# Patient Record
Sex: FEMALE | Race: WHITE | NOT HISPANIC OR LATINO | Employment: OTHER | URBAN - METROPOLITAN AREA
[De-identification: names, ages, dates, MRNs, and addresses within clinical notes are randomized per-mention and may not be internally consistent; named-entity substitution may affect disease eponyms.]

---

## 2017-07-03 ENCOUNTER — TELEPHONE (OUTPATIENT)
Dept: FAMILY MEDICINE CLINIC | Facility: CLINIC | Age: 57
End: 2017-07-03

## 2017-07-03 DIAGNOSIS — L98.9 LESION OF NECK: Primary | ICD-10-CM

## 2017-07-03 NOTE — TELEPHONE ENCOUNTER
We have a list of dermatologist.  You could send this to her or she could come by and pick this list up

## 2017-07-03 NOTE — TELEPHONE ENCOUNTER
Pt is going to [phone]. She has a spot on her neck that wont heal. Its itching and speading. She needs to be seen but we have to make appointment. Dr martin said it was ok to do referral.

## 2017-07-03 NOTE — TELEPHONE ENCOUNTER
Pt is going to [facility] [phone]. She has a spot on her neck that wont heal. Its itching and speading. She needs to be seen but we have to make appointment. Dr quiroz said it was ok to do referral.    Dr Quiroz you need to put the referral in for her because they wont let her make the appt

## 2017-07-03 NOTE — TELEPHONE ENCOUNTER
PT SAID SHE HAS A SPOT ON HER NECK THAT HAS BEEN RED AND SORE FOR AT LEAST A YEAR NOW AND WANTS TO KNOW IF [facility] WOULD REFER HER TO A DERMATOLOGIST     [phone]

## 2017-09-14 ENCOUNTER — OFFICE VISIT (OUTPATIENT)
Dept: FAMILY MEDICINE CLINIC | Facility: CLINIC | Age: 57
End: 2017-09-14

## 2017-09-14 VITALS
OXYGEN SATURATION: 98 % | TEMPERATURE: 98.6 F | BODY MASS INDEX: 28.28 KG/M2 | WEIGHT: 176 LBS | HEART RATE: 77 BPM | SYSTOLIC BLOOD PRESSURE: 118 MMHG | HEIGHT: 66 IN | DIASTOLIC BLOOD PRESSURE: 70 MMHG | RESPIRATION RATE: 18 BRPM

## 2017-09-14 DIAGNOSIS — R13.10 DYSPHAGIA, UNSPECIFIED TYPE: ICD-10-CM

## 2017-09-14 DIAGNOSIS — J35.1 TONSILLAR ENLARGEMENT: ICD-10-CM

## 2017-09-14 DIAGNOSIS — R73.9 HYPERGLYCEMIA: ICD-10-CM

## 2017-09-14 DIAGNOSIS — M26.629 TMJ SYNDROME: ICD-10-CM

## 2017-09-14 DIAGNOSIS — S81.801A WOUND OF RIGHT LEG, INITIAL ENCOUNTER: ICD-10-CM

## 2017-09-14 DIAGNOSIS — E03.9 HYPOTHYROIDISM, UNSPECIFIED TYPE: Primary | ICD-10-CM

## 2017-09-14 LAB
ALBUMIN SERPL-MCNC: 4.7 G/DL (ref 3.5–5.2)
ALBUMIN/GLOB SERPL: 1.6 G/DL
ALP SERPL-CCNC: 107 U/L (ref 39–117)
ALT SERPL W P-5'-P-CCNC: 27 U/L (ref 1–33)
ANION GAP SERPL CALCULATED.3IONS-SCNC: 12.7 MMOL/L
AST SERPL-CCNC: 22 U/L (ref 1–32)
BILIRUB SERPL-MCNC: 0.3 MG/DL (ref 0.1–1.2)
BUN BLD-MCNC: 15 MG/DL (ref 6–20)
BUN/CREAT SERPL: 18.8 (ref 7–25)
CALCIUM SPEC-SCNC: 10.4 MG/DL (ref 8.6–10.5)
CHLORIDE SERPL-SCNC: 103 MMOL/L (ref 98–107)
CHOLEST SERPL-MCNC: 235 MG/DL (ref 0–200)
CO2 SERPL-SCNC: 28.3 MMOL/L (ref 22–29)
CREAT BLD-MCNC: 0.8 MG/DL (ref 0.57–1)
ERYTHROCYTE [DISTWIDTH] IN BLOOD BY AUTOMATED COUNT: 11.9 % (ref 4.5–15)
GFR SERPL CREATININE-BSD FRML MDRD: 74 ML/MIN/1.73
GLOBULIN UR ELPH-MCNC: 3 GM/DL
GLUCOSE BLD-MCNC: 87 MG/DL (ref 65–99)
HBA1C MFR BLD: 5.03 % (ref 4.8–5.6)
HCT VFR BLD AUTO: 44.1 % (ref 31–42)
HDLC SERPL-MCNC: 56 MG/DL (ref 40–60)
HGB BLD-MCNC: 15.1 G/DL (ref 12–18)
LDLC SERPL CALC-MCNC: 118 MG/DL (ref 0–100)
LDLC/HDLC SERPL: 2.11 {RATIO}
LYMPHOCYTES # BLD AUTO: 3.7 10*3/MM3 (ref 1.2–3.4)
LYMPHOCYTES NFR BLD AUTO: 36.9 % (ref 21–51)
MCH RBC QN AUTO: 29.6 PG (ref 26.1–33.1)
MCHC RBC AUTO-ENTMCNC: 34.2 G/DL (ref 33–37)
MCV RBC AUTO: 86.7 FL (ref 80–99)
MONOCYTES # BLD AUTO: 0.7 10*3/MM3 (ref 0.1–0.6)
MONOCYTES NFR BLD AUTO: 6.6 % (ref 2–9)
NEUTROPHILS # BLD AUTO: 5.7 10*3/MM3 (ref 1.4–6.5)
NEUTROPHILS NFR BLD AUTO: 56.5 % (ref 42–75)
PLATELET # BLD AUTO: 319 10*3/MM3 (ref 150–450)
PMV BLD AUTO: 7.2 FL (ref 7.1–10.5)
POTASSIUM BLD-SCNC: 4.4 MMOL/L (ref 3.5–5.2)
PROT SERPL-MCNC: 7.7 G/DL (ref 6–8.5)
RBC # BLD AUTO: 5.08 10*6/MM3 (ref 4–6)
SODIUM BLD-SCNC: 144 MMOL/L (ref 136–145)
T-UPTAKE NFR SERPL: 1.15 TBI (ref 0.8–1.3)
T4 SERPL-MCNC: 7.12 MCG/DL (ref 4.5–11.7)
TRIGL SERPL-MCNC: 303 MG/DL (ref 0–150)
TSH SERPL DL<=0.05 MIU/L-ACNC: 0.9 MIU/ML (ref 0.27–4.2)
VLDLC SERPL-MCNC: 60.6 MG/DL (ref 5–40)
WBC NRBC COR # BLD: 10 10*3/MM3 (ref 4.5–10)

## 2017-09-14 PROCEDURE — 80053 COMPREHEN METABOLIC PANEL: CPT | Performed by: FAMILY MEDICINE

## 2017-09-14 PROCEDURE — 99214 OFFICE O/P EST MOD 30 MIN: CPT | Performed by: FAMILY MEDICINE

## 2017-09-14 PROCEDURE — 80061 LIPID PANEL: CPT | Performed by: FAMILY MEDICINE

## 2017-09-14 PROCEDURE — 84479 ASSAY OF THYROID (T3 OR T4): CPT | Performed by: FAMILY MEDICINE

## 2017-09-14 PROCEDURE — 84443 ASSAY THYROID STIM HORMONE: CPT | Performed by: FAMILY MEDICINE

## 2017-09-14 PROCEDURE — 36415 COLL VENOUS BLD VENIPUNCTURE: CPT | Performed by: FAMILY MEDICINE

## 2017-09-14 PROCEDURE — 84436 ASSAY OF TOTAL THYROXINE: CPT | Performed by: FAMILY MEDICINE

## 2017-09-14 PROCEDURE — 83036 HEMOGLOBIN GLYCOSYLATED A1C: CPT | Performed by: FAMILY MEDICINE

## 2017-09-14 PROCEDURE — 85025 COMPLETE CBC W/AUTO DIFF WBC: CPT | Performed by: FAMILY MEDICINE

## 2017-09-14 RX ORDER — SODIUM PHOSPHATE,MONO-DIBASIC 19G-7G/118
1 ENEMA (ML) RECTAL 2 TIMES DAILY WITH MEALS
COMMUNITY

## 2017-09-14 RX ORDER — UBIDECARENONE 100 MG
100 CAPSULE ORAL 2 TIMES DAILY
COMMUNITY

## 2017-09-14 RX ORDER — MULTIPLE VITAMINS W/ MINERALS TAB 9MG-400MCG
1 TAB ORAL DAILY
COMMUNITY

## 2017-09-14 NOTE — PROGRESS NOTES
SUBJECTIVE:  The patient is a 56-year-old white female who comes in with several issues.  She feels a pressure sensation in her neck.  At night she wakes up and chokes.  He denies other signs of reflux.  She hurts in her temporomandibular joint in that she has had TMJ syndrome in the past.  The anterior part of her neck is sore.  She had what appears to be a bite wound on her right lower extremity a month ago.  She brings in pictures of these and the wound looked very infected.  She's been self treating with steroids and topical antibiotics.  She states is much better.    PAST MEDICAL HISTORY:  Reviewed.    REVIEW OF SYSTEMS:  Please see above; 14 point ROS otherwise negative.      OBJECTIVE: Vitals signs are reviewed and are stable.    HEENT: PERRLA.  The right tonsil is enlarged and appears to be pushing on the uvula.  The left side of the oral pharynx looks normal.  TMs clear.  Neck:  Supple.  Fullness is present in the anterior neck.  No tenderness.  Lungs:  Clear.    Heart:  Regular rate and rhythm.    Abdomen:   Soft, nontender.    Extremities:  No cyanosis, clubbing or edema.  There is only a minimal area of faint erythema present over a pinpoint almost healed wound on the right lateral tib-fib region.    ASSESSMENT:    Thyromegaly  Unilateral tonsillar enlargement  Probable spider bite-healed (this seems unusual to me)  Dysphagia  TMJ syndrome    PLAN:  The area on the lower leg does look much better than the pictures previously.  The patient does not want to take an antibiotic.  She will advise me if the area worsens.  There is still minimal redness present however she does not want to take treatment for this.  She'll be referred to ENT.  An ultrasound of her thyroid will be obtained.  She'll have a TSH thyroid profile CMP fasting lipid hemoglobin A1c and CBC done.  She will follow up on her labs.  She will notify me if any problems in the interim.    Much of this encounter note is an electronic  transcription/translation of spoken language to printed text.  The electronic translation of spoken language may permit erroneous, or at times, nonsensical words or phrases to be inadvertently transcribed.  Although I have reviewed the note for such errors, some may still exist.

## 2017-09-21 ENCOUNTER — TELEPHONE (OUTPATIENT)
Dept: FAMILY MEDICINE CLINIC | Facility: CLINIC | Age: 57
End: 2017-09-21

## 2017-09-21 RX ORDER — DOXYCYCLINE HYCLATE 100 MG/1
100 CAPSULE ORAL 2 TIMES DAILY
Qty: 20 CAPSULE | Refills: 0 | Status: SHIPPED | OUTPATIENT
Start: 2017-09-21 | End: 2019-08-07

## 2017-09-21 NOTE — TELEPHONE ENCOUNTER
PT WANTS TO HAVE SPOT ON LEG TREATED AND NEEDS MEDS FOR IT. SAID SHE CAME IN ON Monday AND DR LANE WANTED TO TREAT IT BUT SHE REFUSED. SHE NOW WANTS TREATMENT FOR IT.

## 2017-09-22 ENCOUNTER — HOSPITAL ENCOUNTER (OUTPATIENT)
Dept: ULTRASOUND IMAGING | Facility: HOSPITAL | Age: 57
Discharge: HOME | End: 2017-09-22
Admitting: FAMILY MEDICINE

## 2017-09-22 DIAGNOSIS — E03.9 HYPOTHYROIDISM, UNSPECIFIED TYPE: ICD-10-CM

## 2017-09-22 PROCEDURE — 76536 US EXAM OF HEAD AND NECK: CPT

## 2017-09-24 DIAGNOSIS — R93.89 ABNORMAL ULTRASOUND: Primary | ICD-10-CM

## 2017-10-05 ENCOUNTER — LAB (OUTPATIENT)
Dept: LAB | Facility: HOSPITAL | Age: 57
End: 2017-10-05
Attending: OTOLARYNGOLOGY

## 2017-10-05 ENCOUNTER — TRANSCRIBE ORDERS (OUTPATIENT)
Dept: LAB | Facility: HOSPITAL | Age: 57
End: 2017-10-05

## 2017-10-05 DIAGNOSIS — N20.0 URIC ACID NEPHROLITHIASIS: ICD-10-CM

## 2017-10-05 DIAGNOSIS — E83.52 HYPERCALCEMIA: Primary | ICD-10-CM

## 2017-10-05 DIAGNOSIS — E83.52 HYPERCALCEMIA: ICD-10-CM

## 2017-10-05 LAB
25(OH)D3 SERPL-MCNC: 30.8 NG/ML (ref 30–100)
CALCIUM SPEC-SCNC: 9.4 MG/DL (ref 8.6–10.5)
MAGNESIUM SERPL-MCNC: 2.1 MG/DL (ref 1.6–2.6)
PTH-INTACT SERPL-MCNC: 61.3 PG/ML (ref 15–65)

## 2017-10-05 PROCEDURE — 83735 ASSAY OF MAGNESIUM: CPT

## 2017-10-05 PROCEDURE — 83970 ASSAY OF PARATHORMONE: CPT

## 2017-10-05 PROCEDURE — 82310 ASSAY OF CALCIUM: CPT

## 2017-10-05 PROCEDURE — 36415 COLL VENOUS BLD VENIPUNCTURE: CPT

## 2017-10-05 PROCEDURE — 82306 VITAMIN D 25 HYDROXY: CPT

## 2018-11-06 ENCOUNTER — TELEPHONE (OUTPATIENT)
Dept: FAMILY MEDICINE CLINIC | Facility: CLINIC | Age: 58
End: 2018-11-06

## 2018-11-06 DIAGNOSIS — L98.9 ARM LESION: Primary | ICD-10-CM

## 2018-11-06 NOTE — TELEPHONE ENCOUNTER
PT STATES SHE HAS A PLACE/KNOT ON HER RIGHT ARM, IT IS SORE AND ITCHES.  SHE ASKED FOR  TO PLEASE PUT IN A REFERRAL FOR HER TO SEE A DERMATOLOGIST FOR THIS.  SHE SAID DR. YEN OROZCO, WHICH IS WHO SHE SAW FOR HER NECK IS FINE. BUT IT DOES NOT HAVE TO BE HER, JUST AS LONG AS IT IS A DOCTOR THAT HER INSURANCE WILL COVER

## 2019-08-07 ENCOUNTER — OFFICE VISIT (OUTPATIENT)
Dept: FAMILY MEDICINE CLINIC | Facility: CLINIC | Age: 59
End: 2019-08-07

## 2019-08-07 VITALS
SYSTOLIC BLOOD PRESSURE: 112 MMHG | HEART RATE: 86 BPM | OXYGEN SATURATION: 98 % | BODY MASS INDEX: 28.54 KG/M2 | TEMPERATURE: 97.7 F | HEIGHT: 66 IN | WEIGHT: 177.6 LBS | DIASTOLIC BLOOD PRESSURE: 80 MMHG

## 2019-08-07 DIAGNOSIS — Z00.00 ANNUAL PHYSICAL EXAM: Primary | ICD-10-CM

## 2019-08-07 DIAGNOSIS — Z80.0 FAMILY HISTORY OF COLON CANCER IN FATHER: ICD-10-CM

## 2019-08-07 DIAGNOSIS — Z12.11 COLON CANCER SCREENING: ICD-10-CM

## 2019-08-07 DIAGNOSIS — R10.9 ABDOMINAL DISCOMFORT: ICD-10-CM

## 2019-08-07 DIAGNOSIS — M54.6 CHRONIC MIDLINE THORACIC BACK PAIN: ICD-10-CM

## 2019-08-07 DIAGNOSIS — Z90.49 HISTORY OF BOWEL RESECTION: ICD-10-CM

## 2019-08-07 DIAGNOSIS — G89.29 CHRONIC MIDLINE THORACIC BACK PAIN: ICD-10-CM

## 2019-08-07 LAB
ALBUMIN SERPL-MCNC: 4.9 G/DL (ref 3.5–5.2)
ALBUMIN/GLOB SERPL: 1.6 G/DL
ALP SERPL-CCNC: 109 U/L (ref 39–117)
ALT SERPL W P-5'-P-CCNC: 28 U/L (ref 1–33)
ANION GAP SERPL CALCULATED.3IONS-SCNC: 11.2 MMOL/L (ref 5–15)
AST SERPL-CCNC: 24 U/L (ref 1–32)
BILIRUB SERPL-MCNC: 0.3 MG/DL (ref 0.2–1.2)
BUN BLD-MCNC: 10 MG/DL (ref 6–20)
BUN/CREAT SERPL: 13.2 (ref 7–25)
CALCIUM SPEC-SCNC: 10.1 MG/DL (ref 8.6–10.5)
CHLORIDE SERPL-SCNC: 101 MMOL/L (ref 98–107)
CHOLEST SERPL-MCNC: 220 MG/DL (ref 0–200)
CO2 SERPL-SCNC: 29.8 MMOL/L (ref 22–29)
CREAT BLD-MCNC: 0.76 MG/DL (ref 0.57–1)
ERYTHROCYTE [DISTWIDTH] IN BLOOD BY AUTOMATED COUNT: 12.1 % (ref 12.3–15.4)
GFR SERPL CREATININE-BSD FRML MDRD: 78 ML/MIN/1.73
GLOBULIN UR ELPH-MCNC: 3.1 GM/DL
GLUCOSE BLD-MCNC: 93 MG/DL (ref 65–99)
HCT VFR BLD AUTO: 44.4 % (ref 34–46.6)
HDLC SERPL-MCNC: 58 MG/DL (ref 40–60)
HGB BLD-MCNC: 14.9 G/DL (ref 12–15.9)
LDLC SERPL CALC-MCNC: 140 MG/DL (ref 0–100)
LDLC/HDLC SERPL: 2.41 {RATIO}
LYMPHOCYTES # BLD AUTO: 2.8 10*3/MM3 (ref 0.7–3.1)
LYMPHOCYTES NFR BLD AUTO: 45.4 % (ref 19.6–45.3)
MCH RBC QN AUTO: 29 PG (ref 26.6–33)
MCHC RBC AUTO-ENTMCNC: 33.5 G/DL (ref 31.5–35.7)
MCV RBC AUTO: 86.6 FL (ref 79–97)
MONOCYTES # BLD AUTO: 0.5 10*3/MM3 (ref 0.1–0.9)
MONOCYTES NFR BLD AUTO: 7.7 % (ref 5–12)
NEUTROPHILS # BLD AUTO: 2.9 10*3/MM3 (ref 1.7–7)
NEUTROPHILS NFR BLD AUTO: 46.9 % (ref 42.7–76)
PLATELET # BLD AUTO: 296 10*3/MM3 (ref 140–450)
PMV BLD AUTO: 7.5 FL (ref 6–12)
POTASSIUM BLD-SCNC: 4.7 MMOL/L (ref 3.5–5.2)
PROT SERPL-MCNC: 8 G/DL (ref 6–8.5)
RBC # BLD AUTO: 5.13 10*6/MM3 (ref 3.77–5.28)
SODIUM BLD-SCNC: 142 MMOL/L (ref 136–145)
TRIGL SERPL-MCNC: 112 MG/DL (ref 0–150)
TSH SERPL DL<=0.05 MIU/L-ACNC: 1.19 MIU/ML (ref 0.27–4.2)
VLDLC SERPL-MCNC: 22.4 MG/DL (ref 5–40)
WBC NRBC COR # BLD: 6.1 10*3/MM3 (ref 3.4–10.8)

## 2019-08-07 PROCEDURE — 80061 LIPID PANEL: CPT | Performed by: NURSE PRACTITIONER

## 2019-08-07 PROCEDURE — 80053 COMPREHEN METABOLIC PANEL: CPT | Performed by: NURSE PRACTITIONER

## 2019-08-07 PROCEDURE — 85025 COMPLETE CBC W/AUTO DIFF WBC: CPT | Performed by: NURSE PRACTITIONER

## 2019-08-07 PROCEDURE — 84443 ASSAY THYROID STIM HORMONE: CPT | Performed by: NURSE PRACTITIONER

## 2019-08-07 PROCEDURE — 99396 PREV VISIT EST AGE 40-64: CPT | Performed by: NURSE PRACTITIONER

## 2019-08-07 PROCEDURE — 36415 COLL VENOUS BLD VENIPUNCTURE: CPT | Performed by: NURSE PRACTITIONER

## 2019-08-07 RX ORDER — CYCLOBENZAPRINE HCL 5 MG
5 TABLET ORAL EVERY 8 HOURS PRN
Qty: 30 TABLET | Refills: 1 | Status: SHIPPED | OUTPATIENT
Start: 2019-08-07 | End: 2021-02-22 | Stop reason: SDUPTHER

## 2019-08-07 RX ORDER — CHLORAL HYDRATE 500 MG
CAPSULE ORAL
COMMUNITY

## 2019-08-07 NOTE — PROGRESS NOTES
"Subjective   Paige Garnica is a 58 y.o. female.     History of Present Illness   Here today for annual exam, working on diet and exercise, walks about 2 miles about 3 days per week, she is not fasting today, uses Flonase as needed for drainage. She is UTD on dentist and eye doctor, denies smoking.   She has hx of back pain, spasm, taking flexeril as needed for spasm. Needs refill today. States back pain, mid back pain, comes and goes. Has had for years.   She sees GYN Dr. Vega thinks it has been about 3 years since last pap and mammo, she states last pap normal. She has never had colonoscopy, she has never had dexa.   Hx of bowel obstruction, adhesions as infant, hx of csection, she states she feels abd \"pulling\" near abd incision, noted for the pat 4 years, had colon resection, appendectomy in 2015, denies diarrhea or vomiting states BM normal. States feels like \"baby kicking.\"       The following portions of the patient's history were reviewed and updated as appropriate: allergies, current medications, past family history, past medical history, past social history, past surgical history and problem list.    Review of Systems   Constitutional: Negative for chills, diaphoresis and fever.   Respiratory: Negative for cough and shortness of breath.    Cardiovascular: Negative for chest pain.   Musculoskeletal: Positive for back pain. Negative for arthralgias and myalgias.   Neurological: Negative for dizziness, light-headedness and headache.   All other systems reviewed and are negative.      Objective   Physical Exam   Constitutional: She is oriented to person, place, and time. She appears well-developed and well-nourished.   HENT:   Head: Normocephalic.   Eyes: Pupils are equal, round, and reactive to light.   Neck: Normal range of motion.   Cardiovascular: Normal rate, regular rhythm, normal heart sounds and intact distal pulses.   Pulmonary/Chest: Effort normal and breath sounds normal.   Abdominal: Soft. " Bowel sounds are normal. She exhibits no distension. There is no hepatosplenomegaly. There is no tenderness. There is no rigidity and no guarding.       Musculoskeletal: Normal range of motion.   Lymphadenopathy:     She has no cervical adenopathy.   Neurological: She is alert and oriented to person, place, and time. She has normal strength. No cranial nerve deficit or sensory deficit. She displays a negative Romberg sign.   Skin: Skin is warm and dry.   Psychiatric: She has a normal mood and affect. Her behavior is normal.   Nursing note and vitals reviewed.        Assessment/Plan   Paige was seen today for annual exam.    Diagnoses and all orders for this visit:    Annual physical exam  -     CBC & Differential  -     Comprehensive Metabolic Panel  -     Lipid Panel  -     TSH  -     Cancel: Urinalysis With Microscopic - Urine, Clean Catch  -     CBC Auto Differential    Colon cancer screening  -     Ambulatory Referral For Screening Colonoscopy  -     Ambulatory Referral to General Surgery    Family history of colon cancer in father  -     Ambulatory Referral For Screening Colonoscopy  -     Ambulatory Referral to General Surgery    History of bowel resection  -     Ambulatory Referral to General Surgery    Abdominal discomfort  -     Ambulatory Referral to General Surgery    Chronic midline thoracic back pain    Other orders  -     cyclobenzaprine (FLEXERIL) 5 MG tablet; Take 1 tablet by mouth Every 8 (Eight) Hours As Needed for Muscle Spasms.        Labs today will call with results.   She will return for over due pap and mammo educated about womens health screenings for cancer.   Colonoscopy referral, she will be called with appt.   Refer to gen surg will call with appt.   If any worsening abd pain, symptoms advised ER.   Cont diet and exercise,   Increase fluid intake, get plenty of rest.   Patient agrees with plan of care and understands instructions. Call if worsening symptoms or any problems or  concerns.

## 2019-08-07 NOTE — PATIENT INSTRUCTIONS
Labs today will call with results.   She will return for over due pap and mammo educated about womens health screenings for cancer.   Colonoscopy referral, she will be called with appt.   Refer to gen surg will call with appt.   If any worsening abd pain, symptoms advised ER.   Cont diet and exercise,   Increase fluid intake, get plenty of rest.   Patient agrees with plan of care and understands instructions. Call if worsening symptoms or any problems or concerns.

## 2020-01-24 ENCOUNTER — TELEPHONE (OUTPATIENT)
Dept: FAMILY MEDICINE CLINIC | Facility: CLINIC | Age: 60
End: 2020-01-24

## 2020-01-25 DIAGNOSIS — N20.0 KIDNEY STONES: Primary | ICD-10-CM

## 2021-02-22 ENCOUNTER — OFFICE VISIT (OUTPATIENT)
Dept: ORTHOPEDIC SURGERY | Facility: CLINIC | Age: 61
End: 2021-02-22

## 2021-02-22 ENCOUNTER — OFFICE VISIT (OUTPATIENT)
Dept: FAMILY MEDICINE CLINIC | Facility: CLINIC | Age: 61
End: 2021-02-22

## 2021-02-22 ENCOUNTER — HOSPITAL ENCOUNTER (OUTPATIENT)
Dept: CARDIOLOGY | Facility: HOSPITAL | Age: 61
Discharge: HOME | End: 2021-02-22
Admitting: NURSE PRACTITIONER

## 2021-02-22 VITALS
BODY MASS INDEX: 29.73 KG/M2 | OXYGEN SATURATION: 97 % | TEMPERATURE: 97.3 F | DIASTOLIC BLOOD PRESSURE: 70 MMHG | SYSTOLIC BLOOD PRESSURE: 122 MMHG | HEIGHT: 66 IN | WEIGHT: 185 LBS | RESPIRATION RATE: 18 BRPM | HEART RATE: 68 BPM

## 2021-02-22 VITALS — TEMPERATURE: 98 F | WEIGHT: 182 LBS | HEIGHT: 66 IN | BODY MASS INDEX: 29.25 KG/M2

## 2021-02-22 DIAGNOSIS — M79.89 CALF SWELLING: ICD-10-CM

## 2021-02-22 DIAGNOSIS — M25.562 LEFT KNEE PAIN, UNSPECIFIED CHRONICITY: Primary | ICD-10-CM

## 2021-02-22 DIAGNOSIS — M54.42 CHRONIC BILATERAL LOW BACK PAIN WITH BILATERAL SCIATICA: ICD-10-CM

## 2021-02-22 DIAGNOSIS — M25.562 LEFT KNEE PAIN, UNSPECIFIED CHRONICITY: ICD-10-CM

## 2021-02-22 DIAGNOSIS — M25.562 ACUTE PAIN OF LEFT KNEE: Primary | ICD-10-CM

## 2021-02-22 DIAGNOSIS — Z12.11 ENCOUNTER FOR SCREENING COLONOSCOPY: ICD-10-CM

## 2021-02-22 DIAGNOSIS — M25.462 EFFUSION OF LEFT KNEE: ICD-10-CM

## 2021-02-22 DIAGNOSIS — Z78.9 HISTORY OF RECENT TRAVEL: ICD-10-CM

## 2021-02-22 DIAGNOSIS — M54.2 NECK PAIN: ICD-10-CM

## 2021-02-22 DIAGNOSIS — G89.29 CHRONIC BILATERAL LOW BACK PAIN WITH BILATERAL SCIATICA: ICD-10-CM

## 2021-02-22 DIAGNOSIS — Z00.00 HEALTHCARE MAINTENANCE: ICD-10-CM

## 2021-02-22 DIAGNOSIS — Z13.220 LIPID SCREENING: ICD-10-CM

## 2021-02-22 DIAGNOSIS — M25.562 MECHANICAL PAIN OF LEFT KNEE: ICD-10-CM

## 2021-02-22 DIAGNOSIS — M54.41 CHRONIC BILATERAL LOW BACK PAIN WITH BILATERAL SCIATICA: ICD-10-CM

## 2021-02-22 LAB
25(OH)D3 SERPL-MCNC: 35.9 NG/ML (ref 30–100)
ALBUMIN SERPL-MCNC: 4.6 G/DL (ref 3.5–5.2)
ALBUMIN/GLOB SERPL: 1.6 G/DL
ALP SERPL-CCNC: 139 U/L (ref 39–117)
ALT SERPL W P-5'-P-CCNC: 30 U/L (ref 1–33)
ANION GAP SERPL CALCULATED.3IONS-SCNC: 13.2 MMOL/L (ref 5–15)
AST SERPL-CCNC: 26 U/L (ref 1–32)
BH CV LOWER VASCULAR LEFT COMMON FEMORAL AUGMENT: NORMAL
BH CV LOWER VASCULAR LEFT COMMON FEMORAL COMPETENT: NORMAL
BH CV LOWER VASCULAR LEFT COMMON FEMORAL COMPRESS: NORMAL
BH CV LOWER VASCULAR LEFT COMMON FEMORAL PHASIC: NORMAL
BH CV LOWER VASCULAR LEFT COMMON FEMORAL SPONT: NORMAL
BH CV LOWER VASCULAR LEFT DISTAL FEMORAL COMPRESS: NORMAL
BH CV LOWER VASCULAR LEFT GASTRONEMIUS COMPRESS: NORMAL
BH CV LOWER VASCULAR LEFT GREATER SAPH AK COMPRESS: NORMAL
BH CV LOWER VASCULAR LEFT GREATER SAPH BK COMPRESS: NORMAL
BH CV LOWER VASCULAR LEFT LESSER SAPH COMPRESS: NORMAL
BH CV LOWER VASCULAR LEFT MID FEMORAL AUGMENT: NORMAL
BH CV LOWER VASCULAR LEFT MID FEMORAL COMPETENT: NORMAL
BH CV LOWER VASCULAR LEFT MID FEMORAL COMPRESS: NORMAL
BH CV LOWER VASCULAR LEFT MID FEMORAL PHASIC: NORMAL
BH CV LOWER VASCULAR LEFT MID FEMORAL SPONT: NORMAL
BH CV LOWER VASCULAR LEFT PERONEAL COMPRESS: NORMAL
BH CV LOWER VASCULAR LEFT POPLITEAL AUGMENT: NORMAL
BH CV LOWER VASCULAR LEFT POPLITEAL COMPETENT: NORMAL
BH CV LOWER VASCULAR LEFT POPLITEAL COMPRESS: NORMAL
BH CV LOWER VASCULAR LEFT POPLITEAL PHASIC: NORMAL
BH CV LOWER VASCULAR LEFT POPLITEAL SPONT: NORMAL
BH CV LOWER VASCULAR LEFT POSTERIOR TIBIAL COMPRESS: NORMAL
BH CV LOWER VASCULAR LEFT PROFUNDA FEMORAL COMPRESS: NORMAL
BH CV LOWER VASCULAR LEFT PROXIMAL FEMORAL COMPRESS: NORMAL
BH CV LOWER VASCULAR LEFT SAPHENOFEMORAL JUNCTION COMPRESS: NORMAL
BH CV LOWER VASCULAR RIGHT COMMON FEMORAL AUGMENT: NORMAL
BH CV LOWER VASCULAR RIGHT COMMON FEMORAL COMPETENT: NORMAL
BH CV LOWER VASCULAR RIGHT COMMON FEMORAL COMPRESS: NORMAL
BH CV LOWER VASCULAR RIGHT COMMON FEMORAL PHASIC: NORMAL
BH CV LOWER VASCULAR RIGHT COMMON FEMORAL SPONT: NORMAL
BILIRUB SERPL-MCNC: 0.2 MG/DL (ref 0–1.2)
BUN SERPL-MCNC: 16 MG/DL (ref 8–23)
BUN/CREAT SERPL: 21.9 (ref 7–25)
CALCIUM SPEC-SCNC: 9.8 MG/DL (ref 8.6–10.5)
CHLORIDE SERPL-SCNC: 103 MMOL/L (ref 98–107)
CHOLEST SERPL-MCNC: 215 MG/DL (ref 0–200)
CO2 SERPL-SCNC: 26.8 MMOL/L (ref 22–29)
CREAT SERPL-MCNC: 0.73 MG/DL (ref 0.57–1)
ERYTHROCYTE [DISTWIDTH] IN BLOOD BY AUTOMATED COUNT: 12.6 % (ref 12.3–15.4)
GFR SERPL CREATININE-BSD FRML MDRD: 81 ML/MIN/1.73
GLOBULIN UR ELPH-MCNC: 2.8 GM/DL
GLUCOSE SERPL-MCNC: 74 MG/DL (ref 65–99)
HCT VFR BLD AUTO: 44.1 % (ref 34–46.6)
HDLC SERPL-MCNC: 57 MG/DL (ref 40–60)
HGB BLD-MCNC: 15.2 G/DL (ref 12–15.9)
LDLC SERPL CALC-MCNC: 127 MG/DL (ref 0–100)
LDLC/HDLC SERPL: 2.14 {RATIO}
LYMPHOCYTES # BLD AUTO: 3 10*3/MM3 (ref 0.7–3.1)
LYMPHOCYTES NFR BLD AUTO: 41.1 % (ref 19.6–45.3)
MCH RBC QN AUTO: 30 PG (ref 26.6–33)
MCHC RBC AUTO-ENTMCNC: 34.5 G/DL (ref 31.5–35.7)
MCV RBC AUTO: 87.1 FL (ref 79–97)
MONOCYTES # BLD AUTO: 0.1 10*3/MM3 (ref 0.1–0.9)
MONOCYTES NFR BLD AUTO: 1.8 % (ref 5–12)
NEUTROPHILS NFR BLD AUTO: 4.2 10*3/MM3 (ref 1.7–7)
NEUTROPHILS NFR BLD AUTO: 57.1 % (ref 42.7–76)
PLATELET # BLD AUTO: 332 10*3/MM3 (ref 140–450)
PMV BLD AUTO: 7.2 FL (ref 6–12)
POTASSIUM SERPL-SCNC: 4.6 MMOL/L (ref 3.5–5.2)
PROT SERPL-MCNC: 7.4 G/DL (ref 6–8.5)
RBC # BLD AUTO: 5.07 10*6/MM3 (ref 3.77–5.28)
SODIUM SERPL-SCNC: 143 MMOL/L (ref 136–145)
TRIGL SERPL-MCNC: 179 MG/DL (ref 0–150)
TSH SERPL DL<=0.05 MIU/L-ACNC: 1.51 UIU/ML (ref 0.27–4.2)
VIT B12 BLD-MCNC: 426 PG/ML (ref 211–946)
VLDLC SERPL-MCNC: 31 MG/DL (ref 5–40)
WBC # BLD AUTO: 7.3 10*3/MM3 (ref 3.4–10.8)

## 2021-02-22 PROCEDURE — 80061 LIPID PANEL: CPT | Performed by: NURSE PRACTITIONER

## 2021-02-22 PROCEDURE — 82306 VITAMIN D 25 HYDROXY: CPT | Performed by: NURSE PRACTITIONER

## 2021-02-22 PROCEDURE — 93971 EXTREMITY STUDY: CPT

## 2021-02-22 PROCEDURE — 85025 COMPLETE CBC W/AUTO DIFF WBC: CPT | Performed by: NURSE PRACTITIONER

## 2021-02-22 PROCEDURE — 80053 COMPREHEN METABOLIC PANEL: CPT | Performed by: NURSE PRACTITIONER

## 2021-02-22 PROCEDURE — 73562 X-RAY EXAM OF KNEE 3: CPT | Performed by: NURSE PRACTITIONER

## 2021-02-22 PROCEDURE — 84443 ASSAY THYROID STIM HORMONE: CPT | Performed by: NURSE PRACTITIONER

## 2021-02-22 PROCEDURE — 99213 OFFICE O/P EST LOW 20 MIN: CPT | Performed by: NURSE PRACTITIONER

## 2021-02-22 PROCEDURE — 99214 OFFICE O/P EST MOD 30 MIN: CPT | Performed by: NURSE PRACTITIONER

## 2021-02-22 PROCEDURE — 82607 VITAMIN B-12: CPT | Performed by: NURSE PRACTITIONER

## 2021-02-22 PROCEDURE — 72100 X-RAY EXAM L-S SPINE 2/3 VWS: CPT | Performed by: NURSE PRACTITIONER

## 2021-02-22 PROCEDURE — 36415 COLL VENOUS BLD VENIPUNCTURE: CPT | Performed by: NURSE PRACTITIONER

## 2021-02-22 RX ORDER — EPINEPHRINE 0.3 MG/.3ML
INJECTION SUBCUTANEOUS
COMMUNITY
End: 2021-02-22 | Stop reason: SDUPTHER

## 2021-02-22 RX ORDER — EPINEPHRINE 0.3 MG/.3ML
0.3 INJECTION SUBCUTANEOUS ONCE
Qty: 1 EACH | Refills: 0 | Status: SHIPPED | OUTPATIENT
Start: 2021-02-22 | End: 2021-02-22

## 2021-02-22 RX ORDER — CYCLOBENZAPRINE HCL 5 MG
5 TABLET ORAL EVERY 8 HOURS PRN
Qty: 30 TABLET | Refills: 1 | Status: SHIPPED | OUTPATIENT
Start: 2021-02-22 | End: 2022-02-23

## 2021-02-22 NOTE — PATIENT INSTRUCTIONS
Labs today will call with results.   Lumbar xray today will call with results.   Refer to PT she will call for appt.   Refer to ortho will call with appt,   Encouraged ice, elevation, cont ibuprofen and topical as needed. Knee brace as needed.   Refilled epipen, cont f/u with allergy.   Refused mammo today, over due pap, referred for colonoscopy, educated about cancer screenings.   Patient agrees with plan of care and understands instructions. Call if worsening symptoms or any problems or concerns.

## 2021-02-22 NOTE — PROGRESS NOTES
"Chief Complaint  Knee Pain (hurt left knee 2 week ago on yoga mat)    Subjective          Paige Garnica presents to [facility]  History of Present Illness  C/o left knee pain, states she hurt left knee about 2 weeks ago on her yoga mat. States she was exercising on hands and knees, also tried stretching for back and thinks irritated knee, she states she noticed about 2-3 weeks ago, she states getting worse. She tried topical oint which helped, also tried heat and ice, she does have pain with ambulation, also has pain at night, she has noticed swelling. She has seen ortho prev for knee effusion and steroid injections.   She is fasting today, due for labs.   With low back pain, wondering about Pt, she has had pain for years, comes and goes. States pain worse on right side, she does have sciatica at times, denies numbness and tingling. Also with chronic neck pain, pain with movements at times, taking flexeril as needed, needs refill today.   With severe allergy to alcohol wipes, , sees allergy, has epinephrine mist, needs refill of epipen today.        Objective   Vital Signs:   /70 (BP Location: Left arm, Patient Position: Sitting)   Pulse 68   Temp 97.3 °F (36.3 °C) (Infrared)   Resp 18   Ht 167.6 cm (66\")   Wt 83.9 kg (185 lb)   SpO2 97%   BMI 29.86 kg/m²     Physical Exam  Vitals signs and nursing note reviewed.   Constitutional:       Appearance: She is well-developed.   HENT:      Head: Normocephalic.   Eyes:      Pupils: Pupils are equal, round, and reactive to light.   Cardiovascular:      Rate and Rhythm: Normal rate and regular rhythm.      Heart sounds: Normal heart sounds.   Pulmonary:      Effort: Pulmonary effort is normal.      Breath sounds: Normal breath sounds.   Musculoskeletal:      Right knee: Normal.      Left knee: She exhibits decreased range of motion, swelling and effusion. No tenderness found.      Cervical back: She exhibits decreased " range of motion. She exhibits no tenderness.      Lumbar back: She exhibits normal range of motion and no tenderness.   Skin:     General: Skin is warm and dry.   Neurological:      Mental Status: She is alert and oriented to person, place, and time.   Psychiatric:         Behavior: Behavior normal.         Judgment: Judgment normal.        Result Review :               lumbar xray today for pain, no comparison shows NAD,awaiting radiology over read.       Assessment and Plan    Diagnoses and all orders for this visit:    1. Acute pain of left knee (Primary)  -     Ambulatory Referral to Orthopedic Surgery  -     CBC & Differential  -     Comprehensive Metabolic Panel  -     Lipid Panel  -     TSH  -     Vitamin D 25 hydroxy  -     Vitamin B12  -     CBC Auto Differential    2. Effusion of left knee  -     Ambulatory Referral to Orthopedic Surgery  -     CBC & Differential  -     Comprehensive Metabolic Panel  -     Lipid Panel  -     TSH  -     Vitamin D 25 hydroxy  -     Vitamin B12  -     CBC Auto Differential    3. Chronic bilateral low back pain with bilateral sciatica  -     CBC & Differential  -     Comprehensive Metabolic Panel  -     Lipid Panel  -     TSH  -     Ambulatory Referral to Physical Therapy Evaluate and treat  -     XR Spine Lumbar 2 or 3 View (In Office)  -     Vitamin D 25 hydroxy  -     Vitamin B12  -     CBC Auto Differential    4. Lipid screening  -     CBC & Differential  -     Comprehensive Metabolic Panel  -     Lipid Panel  -     TSH  -     Vitamin D 25 hydroxy  -     Vitamin B12  -     CBC Auto Differential    5. Neck pain  -     CBC & Differential  -     Comprehensive Metabolic Panel  -     Lipid Panel  -     TSH  -     Ambulatory Referral to Physical Therapy Evaluate and treat  -     Vitamin D 25 hydroxy  -     Vitamin B12  -     CBC Auto Differential    6. Healthcare maintenance  -     CBC & Differential  -     Comprehensive Metabolic Panel  -     Lipid Panel  -     TSH  -      Vitamin D 25 hydroxy  -     Vitamin B12  -     CBC Auto Differential    7. Encounter for screening colonoscopy  -     Ambulatory Referral For Screening Colonoscopy  -     Vitamin D 25 hydroxy  -     Vitamin B12    Other orders  -     cyclobenzaprine (FLEXERIL) 5 MG tablet; Take 1 tablet by mouth Every 8 (Eight) Hours As Needed for Muscle Spasms.  Dispense: 30 tablet; Refill: 1  -     EPINEPHrine (EPIPEN) 0.3 MG/0.3ML solution auto-injector injection; Inject 0.3 mL into the appropriate muscle as directed by prescriber 1 (One) Time for 1 dose.  Dispense: 1 each; Refill: 0        Follow Up   Return if symptoms worsen or fail to improve.  Patient was given instructions and counseling regarding her condition or for health maintenance advice. Please see specific information pulled into the AVS if appropriate.       Labs today will call with results.   Lumbar xray today will call with results.   Refer to PT she will call for appt.   Refer to ortho will call with appt,   Encouraged ice, elevation, cont ibuprofen and topical as needed. Knee brace as needed.   Refilled epipen, cont f/u with allergy.   Refused mammo today, over due pap, referred for colonoscopy, educated about cancer screenings.   Patient agrees with plan of care and understands instructions. Call if worsening symptoms or any problems or concerns.

## 2021-02-22 NOTE — PATIENT INSTRUCTIONS
Knee Effusion    Knee effusion means that you have extra fluid in your knee. This can cause pain. Your knee may be more difficult to bend and move.  What are the causes?  Common causes are:  · Arthritis.  · Infection.  · Injury.  · Autoimmune disease. This means that your body's defense system (immune system) mistakenly attacks healthy body tissues.  Follow these instructions at home:  Medicines  · Take medicines only as told by your doctor.  · Do not drive or use heavy machinery while taking prescription pain medicine.  · If you are taking prescription pain medicine, take actions to help prevent or treat trouble pooping (constipation). Your doctor may recommend that you:  ? Drink enough fluid to keep your pee (urine) pale yellow.  ? Eat foods that are high in fiber. These include fresh fruits and vegetables, whole grains, and beans.  ? Avoid eating fatty or sweet foods.  ? Take a medicine for constipation.  If you have a brace:  · Wear the brace as told by your doctor. Remove it only as told by your doctor.  · Loosen the brace if your toes tingle, become numb, or turn cold and blue.  · Keep the brace clean.  · If the brace is not waterproof:  ? Do not let it get wet.  ? Cover it with a watertight covering when you take a bath or a shower.  Managing pain, stiffness, and swelling    · If told, apply ice to the swollen area:  ? If you have a removable brace, remove it as told by your doctor.  ? Put ice in a plastic bag.  ? Place a towel between your skin and the bag.  ? Leave the ice on for 20 minutes, 2-3 times per day.  · Keep your knee raised (elevated) when you are sitting or lying down.  General instructions  · Do not use any products that contain nicotine or tobacco, such as cigarettes and e-cigarettes. These can delay healing. If you need help quitting, ask your doctor.  · Use crutches as told by your doctor.  · Do exercises as told by your doctor.  · Rest as told by your doctor.  · Keep all follow-up visits as  told by your doctor. This is important.  Contact a doctor if you:  · Continue to have pain in your knee.  Get help right away if you:  · Have swelling or redness of your knee that gets worse or does not get better.  · Have serious pain in your knee.  · Have a fever.  Summary  · Knee effusion is when you have extra fluid in your knee. This causes pain and swelling and makes it hard to bend and move your knee.  · Take medicines only as told by your doctor.  · If you have a brace, wear the brace as told by your doctor.  This information is not intended to replace advice given to you by your health care provider. Make sure you discuss any questions you have with your health care provider.  Document Revised: 12/31/2018 Document Reviewed: 12/30/2018  [employer] Patient Education © 2020 [employer]

## 2021-02-22 NOTE — PROGRESS NOTES
Patient Name: Paige Garnica   YOB: 1960  Referring Primary Care Physician: Woody Quiroz MD  BMI: Body mass index is 29.38 kg/m².    Chief Complaint:    Chief Complaint   Patient presents with   • Left Knee - Pain        HPI: New patient to clinic referred by her PCP for left knee pain.  Patient has had 2 weeks of left knee pain started most recently when she was on a yoga mat in the position a downward dog and twisted her knee and had pain she recently drove to [place] and help went to see her son went up and down stairs had pain drove back and has had pain in her knee ever since that is mechanical in nature.  Denies any health problems or history of clots.    Paige Garnica is a 60 y.o. female who presents today for evaluation of   Chief Complaint   Patient presents with   • Left Knee - Pain         Subjective   Medications:   Home Medications:  Current Outpatient Medications on File Prior to Visit   Medication Sig   • glucosamine-chondroitin 500-400 MG capsule capsule Take 1 capsule by mouth 2 (Two) Times a Day With Meals.   • Misc Natural Products (GRAPE SEED COMPLEX PO) Take  by mouth 2 (Two) Times a Day.   • Multiple Vitamins-Minerals (MULTIVITAMIN WITH MINERALS) tablet tablet Take 1 tablet by mouth Daily.   • Omega-3 1000 MG capsule Take  by mouth.   • Probiotic Product (PROBIOTIC DAILY PO) Take  by mouth.   • Unable to find 1 each 2 (Two) Times a Day. Med Name:[employer] anti oxidant  Recovery AI  Wang 3  Azacuity [place](brain health)  K2-D3   • coenzyme Q10 100 MG capsule Take 100 mg by mouth 2 (Two) Times a Day.   • cyclobenzaprine (FLEXERIL) 5 MG tablet Take 1 tablet by mouth Every 8 (Eight) Hours As Needed for Muscle Spasms.   • EPINEPHrine (EPIPEN) 0.3 MG/0.3ML solution auto-injector injection Inject 0.3 mL into the appropriate muscle as directed by prescriber 1 (One) Time for 1 dose.   • [DISCONTINUED] cyclobenzaprine (FLEXERIL) 5 MG tablet Take 1 tablet by mouth Every 8 (Eight) Hours  As Needed for Muscle Spasms.   • [DISCONTINUED] EPINEPHrine (EPIPEN) 0.3 MG/0.3ML solution auto-injector injection      No current facility-administered medications on file prior to visit.      Current Medications:  Scheduled Meds:  Continuous Infusions:No current facility-administered medications for this visit.     PRN Meds:.    I have reviewed the patient's medical history in detail and updated the computerized patient record.  Review and summarization of old records includes:    Past Medical History:   Diagnosis Date   • Allergic    • Kidney stone    • TMJ (dislocation of temporomandibular joint)         Past Surgical History:   Procedure Laterality Date   • APPENDECTOMY     •  SECTION     • COLON SURGERY     • CYSTOSCOPY W/ LASER LITHOTRIPSY     • LAPAROTOMY OOPHERECTOMY Right         Social History     Occupational History   • Not on file   Tobacco Use   • Smoking status: Never Smoker   • Smokeless tobacco: Never Used   Substance and Sexual Activity   • Alcohol use: No   • Drug use: Not on file   • Sexual activity: Not on file      Social History     Social History Narrative   • Not on file        Family History   Problem Relation Age of Onset   • Heart failure Mother    • Diabetes Mother    • Dementia Mother    • COPD Mother    • Heart failure Father    • Hypertension Father    • Colon cancer Father        ROS: 14 point review of systems was performed and all other systems were reviewed and are negative except for documented findings in HPI and today's encounter.     Allergies:   Allergies   Allergen Reactions   • Isopropyl Alcohol Anaphylaxis   • Morphine And Related    • Penicillins    • Phenergan [Promethazine Hcl]      Constitutional:  Denies fever, shaking or chills   Eyes:  Denies change in visual acuity   HENT:  Denies nasal congestion or sore throat   Respiratory:  Denies cough or shortness of breath   Cardiovascular:  Denies chest pain or severe LE edema   GI:  Denies abdominal pain, nausea,  "vomiting, bloody stools or diarrhea   Musculoskeletal:  Numbness, tingling, pain, or loss of motor function only as noted above in history of present illness.  : Denies painful urination or hematuria  Integument:  Denies rash, lesion or ulceration   Neurologic:  Denies headache or focal weakness  Endocrine:  Denies lymphadenopathy  Psych:  Denies confusion or change in mental status   Hem:  Denies active bleeding    OBJECTIVE:  Physical Exam: 60 y.o. female  Wt Readings from Last 3 Encounters:   02/22/21 82.6 kg (182 lb)   02/22/21 83.9 kg (185 lb)   08/07/19 80.6 kg (177 lb 9.6 oz)     Ht Readings from Last 1 Encounters:   02/22/21 167.6 cm (66\")     Body mass index is 29.38 kg/m².  Vitals:    02/22/21 1444   Temp: 98 °F (36.7 °C)     Vital signs reviewed.     General Appearance:    Alert, cooperative, in no acute distress                  Eyes: conjunctiva clear  ENT: external ears and nose atraumatic  CV: no peripheral edema  Resp: normal respiratory effort  Skin: no rashes or wounds; normal turgor  Psych: mood and affect appropriate  Lymph: no nodes appreciated  Neuro: gross sensation intact  Vascular:  Palpable peripheral pulse in noted extremity  Musculoskeletal Extremities: Skin is warm dry and intact with good pulses movement and sensation left knee has a slight suprapatellar effusion with medial joint line tenderness and effusion Alicia's is positive ligamentous exam appears stable she has some swelling in her left calf there is no lymphadenopathy no masses skin is intact ankles nontender    Radiology:   Left knee 3 views were done for pain no available comparison x-rays have pretty preserved joint space with patellofemoral narrowing and effusion on x-ray  Assessment:     ICD-10-CM ICD-9-CM   1. Left knee pain, unspecified chronicity  M25.562 719.46   2. History of recent travel  Z78.9 V49.89   3. Calf swelling  M79.89 729.81   4. Effusion of left knee  M25.462 719.06   5. Mechanical pain of left knee  " M25.562 719.46        Procedures     We will check a Doppler for DVT of the left calf may have a ruptured Baker's cyst with recent travel we will check the Doppler also get an MRI of the left knee to check for a meniscus tear we will get her in a short knee immobilizer for stability as she feels like her knee is going out.  She is taken over-the-counter Advil for pain as needed  MDM/Plan:   The diagnosis(es), natural history, pathophysiology and treatment for diagnosis(es) were discussed. Opportunity given and questions answered.  Biomechanics of pertinent body areas discussed.  When appropriate, the use of ambulatory aids discussed.    The diagnosis(es), natural history, pathophysiology and treatment for diagnosis(es) were discussed. Opportunity given and questions answered.  Biomechanics of pertinent body areas discussed.  When appropriate, the use of ambulatory aids discussed.  BMI:  The concept of BMI body mass index and its importance and implications discussed.    EXERCISES:  Advice on benefits of, and types of regular/moderate exercise pertaining to orthopedic diagnosis(es).  MEDICATIONS:  The risks, benefits, warnings,side effects and alternatives of medications discussed.  Inflammation/pain control; with cold, heat, elevation and/or liniments discussed as appropriate  CONSULT: This Consult is done at the request of a requesting provider to whom I will send this report with this rendered opinion.  MEDICAL RECORDS reviewed from other provider(s) for past and current medical history pertinent to this complaint.          2/22/2021    Much of this encounter note is an electronic transcription/translation of spoken language to printed text. The electronic translation of spoken language may permit erroneous, or at times, nonsensical words or phrases to be inadvertently transcribed; Although I have reviewed the note for such errors, some may still exist

## 2021-02-24 ENCOUNTER — TELEPHONE (OUTPATIENT)
Dept: ORTHOPEDIC SURGERY | Facility: CLINIC | Age: 61
End: 2021-02-24

## 2021-02-24 ENCOUNTER — TELEPHONE (OUTPATIENT)
Dept: FAMILY MEDICINE CLINIC | Facility: CLINIC | Age: 61
End: 2021-02-24

## 2021-02-24 ENCOUNTER — TELEPHONE (OUTPATIENT)
Dept: GASTROENTEROLOGY | Facility: CLINIC | Age: 61
End: 2021-02-24

## 2021-02-24 DIAGNOSIS — R74.8 ELEVATED ALKALINE PHOSPHATASE LEVEL: Primary | ICD-10-CM

## 2021-02-24 NOTE — TELEPHONE ENCOUNTER
PATIENT STATES: that she would like a call back about labs please advise     PATIENT CAN BE REACHED ON: [phone]

## 2021-02-24 NOTE — TELEPHONE ENCOUNTER
Please call patient, lumbar xray shows no fracture, disc space narrowing at L5-S1, consistent with arthritis,  Cont with PT and ortho.

## 2021-02-24 NOTE — TELEPHONE ENCOUNTER
----- Message from TARYN Bauer sent at 2/24/2021  9:30 AM EST -----  Normal doppler - no blood clot - get MRI to assess for ligament or meniscus tear - cim  ----- Message -----  From: Buster Chiang MD  Sent: 2/22/2021   5:03 PM EST  To: TARYN Bauer

## 2021-02-24 NOTE — TELEPHONE ENCOUNTER
Pt called back and she was informed of her results.  She will proceed with the MRI as scheduled and will call 1-2 days later for the results.

## 2021-03-02 ENCOUNTER — HOSPITAL ENCOUNTER (OUTPATIENT)
Dept: MRI IMAGING | Facility: HOSPITAL | Age: 61
Discharge: HOME | End: 2021-03-02
Admitting: NURSE PRACTITIONER

## 2021-03-02 DIAGNOSIS — M25.562 MECHANICAL PAIN OF LEFT KNEE: ICD-10-CM

## 2021-03-02 PROCEDURE — 73721 MRI JNT OF LWR EXTRE W/O DYE: CPT

## 2021-03-03 ENCOUNTER — TELEPHONE (OUTPATIENT)
Dept: ORTHOPEDIC SURGERY | Facility: CLINIC | Age: 61
End: 2021-03-03

## 2021-03-03 DIAGNOSIS — M25.462 EFFUSION OF LEFT KNEE: ICD-10-CM

## 2021-03-03 DIAGNOSIS — M25.562 MECHANICAL PAIN OF LEFT KNEE: ICD-10-CM

## 2021-03-03 DIAGNOSIS — M25.562 LEFT KNEE PAIN, UNSPECIFIED CHRONICITY: Primary | ICD-10-CM

## 2021-03-03 NOTE — TELEPHONE ENCOUNTER
Spoke with pt; she was informed of her results and recommendations.  Referral was placed for sports medicine and she is aware someone will call her to set up an appt.

## 2021-03-03 NOTE — TELEPHONE ENCOUNTER
----- Message from TARYN Bauer sent at 3/3/2021  9:01 AM EST -----    No tear in ligament or meniscus - can follo wup with sports medicine    IMPRESSION:  1. Knee joint effusion suggests synovitis.  2. Mild articular cartilage thinning at the posterior aspect medial  femoral condyle and at the lateral facet of the patella. There is also  chondromalacia patella with articular cartilage thinning and surface  irregularity at the lateral facet of the patella. No meniscal tear or  other evidence for internal derangement.     This report was finalized on 3/3/2021 8:36 AM by Dr. Mateo Bagley M.D.

## 2021-03-04 ENCOUNTER — TELEPHONE (OUTPATIENT)
Dept: ORTHOPEDIC SURGERY | Facility: CLINIC | Age: 61
End: 2021-03-04

## 2021-03-04 ENCOUNTER — TELEPHONE (OUTPATIENT)
Dept: FAMILY MEDICINE CLINIC | Facility: CLINIC | Age: 61
End: 2021-03-04

## 2021-03-04 NOTE — TELEPHONE ENCOUNTER
Caller: Paige Garnica    Relationship to patient: Self    Best call back number: [phone]    Patient is needing: PATIENT IS CALLING IN REGARDS TO HER X RAYS THAT WERE DONE ON 02/22/2021 AND SHE WAS WONDERING IF IT WOULD BE POSSIBLE TO SEE IF SHE HAD ANY KIDNEY STONES. SHE WOULD ALSO LIKE TO KNOW IF THERE WAS A WAY TO HAVE THE COPY OF THE X RAY. SHE WOULD LIKE TO KNOW IF THE PICTURE OF THE X RAY CAN BE PLACED IN HER MYCHART.     PLEASE ADVISE

## 2021-03-04 NOTE — TELEPHONE ENCOUNTER
The lumbar xray from 2/22/2021 did not comment on kidney stones. Usually need a CT for kidney stone evaluation. If her back pain persists can follow up in office for labs and CT to eval for kidney stones or persistent back pain if needed. She can  disc copy of xray and have xray report but unable to load pictures into my chart that I know of.

## 2021-03-05 ENCOUNTER — OFFICE VISIT (OUTPATIENT)
Dept: ORTHOPEDIC SURGERY | Facility: CLINIC | Age: 61
End: 2021-03-05

## 2021-03-05 VITALS — WEIGHT: 185 LBS | TEMPERATURE: 98.4 F | HEIGHT: 66 IN | BODY MASS INDEX: 29.73 KG/M2

## 2021-03-05 DIAGNOSIS — M17.12 ARTHRITIS OF LEFT KNEE: Primary | ICD-10-CM

## 2021-03-05 PROCEDURE — 20610 DRAIN/INJ JOINT/BURSA W/O US: CPT | Performed by: ORTHOPAEDIC SURGERY

## 2021-03-05 PROCEDURE — 99213 OFFICE O/P EST LOW 20 MIN: CPT | Performed by: ORTHOPAEDIC SURGERY

## 2021-03-05 RX ORDER — EPINEPHRINE INHALATION 125 UG/1
AEROSOL RESPIRATORY (INHALATION)
COMMUNITY

## 2021-03-05 RX ORDER — EPINEPHRINE 0.3 MG/.3ML
INJECTION SUBCUTANEOUS
COMMUNITY
Start: 2021-02-22

## 2021-03-05 RX ADMIN — METHYLPREDNISOLONE ACETATE 80 MG: 80 INJECTION, SUSPENSION INTRA-ARTICULAR; INTRALESIONAL; INTRAMUSCULAR; SOFT TISSUE at 08:57

## 2021-03-05 NOTE — PROGRESS NOTES
Patient Name: Paige Garnica   YOB: 1960  Referring Primary Care Physician: Woody Quiroz MD  BMI: Body mass index is 29.86 kg/m².    Chief Complaint:    Chief Complaint   Patient presents with   • Left Knee - Establish Care, Pain        HPI:     Paige Garnica is a 60 y.o. female who presents today for evaluation of   Chief Complaint   Patient presents with   • Left Knee - Establish Care, Pain   . Mrs. Garnica presents today for left knee pain. About 3 weeks ago, she was doing Pilates maneuvers and her knee started to hurt. She denies falling, twisting, or other mechanism of injury. She states she has gotten cortisone injections in the past.     Of note, she is allergic to ISOPROPYL ALCOHOL. She states she goes into anaphylactic shock when it is in the air. During prior injections, she has been sterilized with Betadine.       Subjective   Medications:   Home Medications:  Current Outpatient Medications on File Prior to Visit   Medication Sig   • coenzyme Q10 100 MG capsule Take 100 mg by mouth 2 (Two) Times a Day.   • cyclobenzaprine (FLEXERIL) 5 MG tablet Take 1 tablet by mouth Every 8 (Eight) Hours As Needed for Muscle Spasms.   • EPINEPHrine (EPIPEN) 0.3 MG/0.3ML solution auto-injector injection    • EPINEPHrine (Primatene Mist) 0.125 MG/ACT aerosol Inhale.   • glucosamine-chondroitin 500-400 MG capsule capsule Take 1 capsule by mouth 2 (Two) Times a Day With Meals.   • Misc Natural Products (GRAPE SEED COMPLEX PO) Take  by mouth 2 (Two) Times a Day.   • Multiple Vitamins-Minerals (MULTIVITAMIN WITH MINERALS) tablet tablet Take 1 tablet by mouth Daily.   • Omega-3 1000 MG capsule Take  by mouth.   • Probiotic Product (PROBIOTIC DAILY PO) Take  by mouth.   • Unable to find 1 each 2 (Two) Times a Day. Med Name:[employer] anti oxidant  Recovery AI  Wang 3  Azacuity [place]([employer] health)  K2-D3     No current facility-administered medications on file prior to visit.     Current Medications:  Scheduled  Meds:  Continuous Infusions:No current facility-administered medications for this visit.    PRN Meds:.    I have reviewed the patient's medical history in detail and updated the computerized patient record.  Review and summarization of old records includes:    Past Medical History:   Diagnosis Date   • Allergic    • Kidney stone    • TMJ (dislocation of temporomandibular joint)         Past Surgical History:   Procedure Laterality Date   • APPENDECTOMY     •  SECTION     • COLON SURGERY     • CYSTOSCOPY W/ LASER LITHOTRIPSY     • LAPAROTOMY OOPHERECTOMY Right         Social History     Occupational History   • Not on file   Tobacco Use   • Smoking status: Never Smoker   • Smokeless tobacco: Never Used   Substance and Sexual Activity   • Alcohol use: No   • Drug use: Not on file   • Sexual activity: Not on file      Social History     Social History Narrative   • Not on file        Family History   Problem Relation Age of Onset   • Heart failure Mother    • Diabetes Mother    • Dementia Mother    • COPD Mother    • Heart failure Father    • Hypertension Father    • Colon cancer Father        ROS: 14 point review of systems was performed and all other systems were reviewed and are negative except for documented findings in HPI and today's encounter.     Allergies:   Allergies   Allergen Reactions   • Disinfectant Products Misc Anaphylaxis   • Hand  [Ethyl Alcohol (Skin Cleanser)] Anaphylaxis   • Isopropyl Alcohol Anaphylaxis   • Morphine And Related    • Penicillins    • Phenergan [Promethazine Hcl]      Constitutional:  Denies fever, shaking or chills   Eyes:  Denies change in visual acuity   HENT:  Denies nasal congestion or sore throat   Respiratory:  Denies cough or shortness of breath   Cardiovascular:  Denies chest pain or severe LE edema   GI:  Denies abdominal pain, nausea, vomiting, bloody stools or diarrhea   Musculoskeletal:  Numbness, tingling, pain, or loss of motor function only as noted  "above in history of present illness.  : Denies painful urination or hematuria  Integument:  Denies rash, lesion or ulceration   Neurologic:  Denies headache or focal weakness  Endocrine:  Denies lymphadenopathy  Psych:  Denies confusion or change in mental status   Hem:  Denies active bleeding    OBJECTIVE:  Physical Exam: 60 y.o. female  Wt Readings from Last 3 Encounters:   03/05/21 83.9 kg (185 lb)   02/22/21 82.6 kg (182 lb)   02/22/21 83.9 kg (185 lb)     Ht Readings from Last 1 Encounters:   03/05/21 167.6 cm (66\")     Body mass index is 29.86 kg/m².  Vitals:    03/05/21 1103   Temp: 98.4 °F (36.9 °C)     Vital signs reviewed.     General Appearance:    Alert, cooperative, in no acute distress                  Eyes: conjunctiva clear  ENT: external ears and nose atraumatic  CV: no peripheral edema  Resp: normal respiratory effort  Skin: no rashes or wounds; normal turgor  Psych: mood and affect appropriate  Lymph: no nodes appreciated  Neuro: gross sensation intact  Vascular:  Palpable peripheral pulse in noted extremity  Musculoskeletal Extremities: Left knee: Swelling with some effusion, synovitis, and joint line tenderness.    Radiology:   Previous left knee x-rays with AP, lateral, and 40 degree PA views were obtained for pain on 0/22/2021, and reviewed today. Without comparison views, these demonstrated moderate to severe osteoarthritis. Similar changes on the right that don't bother her.      MRI of the left knee takes on 03/02/2021:  IMPRESSION:  1. Knee joint effusion suggests synovitis.  2. Mild articular cartilage thinning at the posterior aspect medial  femoral condyle and at the lateral facet of the patella. There is also  chondromalacia patella with articular cartilage thinning and surface  irregularity at the lateral facet of the patella. No meniscal tear or  other evidence for internal derangement.      Large Joint Arthrocentesis: L knee  Date/Time: 3/5/2021 8:57 AM  Consent given by: " patient  Site marked: site marked  Timeout: Immediately prior to procedure a time out was called to verify the correct patient, procedure, equipment, support staff and site/side marked as required   Supporting Documentation  Indications: pain and joint swelling   Procedure Details  Location: knee - L knee  Preparation: Patient was prepped and draped in the usual sterile fashion  Needle gauge: 21 G.  Approach: anterolateral  Medications administered: 80 mg methylPREDNISolone acetate 80 MG/ML; 8 mL lidocaine (cardiac)  Patient tolerance: patient tolerated the procedure well with no immediate complications            Assessment:     ICD-10-CM ICD-9-CM   1. Arthritis of left knee  M17.12 716.96        MDM/Plan:   The diagnosis(es), natural history, pathophysiology and treatment for diagnosis(es) were discussed. Opportunity given and questions answered.  Biomechanics of pertinent body areas discussed.  When appropriate, the use of ambulatory aids discussed.    We reviewed X-rays of the left knee and MRI today. The MRI showed some swelling and inflammation in the left knee. Nothing is torn and surgery is not needed. We will do an injection today for inflammation and pain. I recommended using ice, heat, and liniments as well. We could not use isopropyl alcohol, so I prepped with Betadine, injected with 5 cc of lidocaine, aspirated 20 cc of straw-colored fluid and then injected her with 4:1 cortisone mixture without difficulty. She felt immediate relief with some residual synovitis. We will see her back as necessary. I reviewed her records and provided advice.    Scribed for Tapan Jones MD by Fanny Dougherty.  3/9/2021  08:48 EST    I Tapan Jones MD have personally performed the services described in this document as scribed by the above individual, and it is both accurate and complete.       3/8/2021

## 2021-03-09 RX ORDER — METHYLPREDNISOLONE ACETATE 80 MG/ML
80 INJECTION, SUSPENSION INTRA-ARTICULAR; INTRALESIONAL; INTRAMUSCULAR; SOFT TISSUE
Status: COMPLETED | OUTPATIENT
Start: 2021-03-05 | End: 2021-03-05

## 2021-03-30 ENCOUNTER — TELEPHONE (OUTPATIENT)
Dept: FAMILY MEDICINE CLINIC | Facility: CLINIC | Age: 61
End: 2021-03-30

## 2021-03-30 DIAGNOSIS — D22.9 ATYPICAL MOLE: Primary | ICD-10-CM

## 2021-03-30 NOTE — TELEPHONE ENCOUNTER
Caller: Paige Garnica    Relationship: Self    Best call back number: [phone]  CELL:[phone]    What orders are you requesting (i.e. lab or imaging):PHYSICAL THERAPY ORDERS    In what timeframe would the patient need to come in: SOON    Where will you receive your lab/imaging services: AT HOME    Additional notes: PATIENT CALLED IN AND SAID SHE HAS A HIGH SENSITIVITY TO A CERTAIN CHEMICAL IN DISINFECTANTS THAT CAUSES HER THROAT TO SWELL. SHE IS WANTING TO KNOW IF SHE CAN SWITCH HER PHYSICAL THERAPY TO HOME HEALTH AS THE THERAPIST TOLD HER THAT COMING INTO THE FACILITY MIGHT TRIGGER A REACTION. PLEASE CALL PATIENT AND ADVISE.

## 2021-03-30 NOTE — TELEPHONE ENCOUNTER
PATIENT WOULD LIKE A REFERRAL TO DERMATOLOGY TO HAVE A MOLE LOOKED AT. SHE STATES IT IS ON HER BACK. SHE SEES ANIL RICHMOND AT [facility].    PLEASE ADVISE: [phone]

## 2021-03-31 DIAGNOSIS — M54.50 CHRONIC LOW BACK PAIN, UNSPECIFIED BACK PAIN LATERALITY, UNSPECIFIED WHETHER SCIATICA PRESENT: Primary | ICD-10-CM

## 2021-03-31 DIAGNOSIS — G89.29 CHRONIC LOW BACK PAIN, UNSPECIFIED BACK PAIN LATERALITY, UNSPECIFIED WHETHER SCIATICA PRESENT: Primary | ICD-10-CM

## 2021-03-31 DIAGNOSIS — M54.2 NECK PAIN: ICD-10-CM

## 2021-03-31 NOTE — TELEPHONE ENCOUNTER
Also needs referral for derm for moles -April Regan APRN at [facility]- needs referral put in for

## 2021-03-31 NOTE — TELEPHONE ENCOUNTER
I will put in the referral, but not sure if home health will approve this reason, usually home health is approved if you cannot leave the home.

## 2021-04-01 ENCOUNTER — TELEPHONE (OUTPATIENT)
Dept: FAMILY MEDICINE CLINIC | Facility: CLINIC | Age: 61
End: 2021-04-01

## 2021-04-05 ENCOUNTER — TELEPHONE (OUTPATIENT)
Dept: FAMILY MEDICINE CLINIC | Facility: CLINIC | Age: 61
End: 2021-04-05

## 2021-04-05 NOTE — TELEPHONE ENCOUNTER
[facility]IT[facility] EV[facility] FOR PHYSICAL THERAPY  ON THE PATIENT. GENET IS REQUESTING AN ORDER FOR PHYSICAL THERAPY  2 TIMES A WEEK FOR THE FIRST MONTH AND 1 TIME A WEEK FOR THE SECOND MONTH FOR THE PATIENT.    GENET'S CALLBACK: [phone]

## 2021-04-09 ENCOUNTER — OUTSIDE FACILITY SERVICE (OUTPATIENT)
Dept: FAMILY MEDICINE CLINIC | Facility: CLINIC | Age: 61
End: 2021-04-09

## 2021-04-09 PROCEDURE — G0180 MD CERTIFICATION HHA PATIENT: HCPCS | Performed by: NURSE PRACTITIONER

## 2021-05-11 ENCOUNTER — TELEPHONE (OUTPATIENT)
Dept: FAMILY MEDICINE CLINIC | Facility: CLINIC | Age: 61
End: 2021-05-11

## 2021-05-11 ENCOUNTER — TELEPHONE (OUTPATIENT)
Dept: ORTHOPEDIC SURGERY | Facility: CLINIC | Age: 61
End: 2021-05-11

## 2021-05-11 NOTE — TELEPHONE ENCOUNTER
Caller: Paige Garnica    Relationship: Self    Best call back number: [phone]    What is the best time to reach you: ANY    What was the call regarding: PATIENT HAS BEEN RECEIVING BILLS FROM [facility] FOR AN MRI FOR ORTHO. [facility] STATED THERE WAS NEVER A REFERRAL PUT IN FOR THAT AND NOW THE PATIENT IS PAYING BILLS THAT SHOULD BE COVERED. PLEASE CALL PATIENT TO DISCUSS NEXT STEPS.     Do you require a callback: YES

## 2021-05-11 NOTE — TELEPHONE ENCOUNTER
Provider: ZANE SIEGEL    Caller: MARLI RENE    Relationship to Patient: SELF    Phone Number: [phone] (HOME) [phone] (CELL)    Reason for Call: PT SAID THAT ZANE SIEGEL ORDERED AN MRI FOR HER KNEE IN February, PT HAD IT DONE IN MARCH. PT SAID THAT HER INSURANCE DID NOT RECEIVE A REFERRAL AND ARE NOT WANTING TO PAY FOR THE MRI, PT WANTS TO KNOW WHAT SHE NEEDS TO DO AND THE STEPS TO TAKE. PLEASE CALL HER BACK AT THE NUMBERS ABOVE--SHE SAID THAT IF SHE DOES NOT ANSWER ONE OF THEM, TO TRY THE OTHER.

## 2021-05-24 NOTE — TELEPHONE ENCOUNTER
I have emailed the  at the hospital.  They are working on this.  I will let you know if you need to do anything.  Thanks dianna

## 2021-06-14 NOTE — TELEPHONE ENCOUNTER
Caller: MARLI RENE   Relationship to Patient: SELF     Phone Number: [phone]  Reason for Call: PATIENT STATES THAT SHE WOULD LIKE TO SPEAK WITH SOMEONE BECAUSE SHE IS STILL GETTING BILLED, PATIENT STATES NOW SHE GOT A BILL FOR $216.20   DISPUTING $195.20

## 2021-10-08 ENCOUNTER — TELEPHONE (OUTPATIENT)
Dept: FAMILY MEDICINE CLINIC | Facility: CLINIC | Age: 61
End: 2021-10-08

## 2022-02-22 ENCOUNTER — TELEPHONE (OUTPATIENT)
Dept: FAMILY MEDICINE CLINIC | Facility: CLINIC | Age: 62
End: 2022-02-22

## 2022-02-23 ENCOUNTER — OFFICE VISIT (OUTPATIENT)
Dept: FAMILY MEDICINE CLINIC | Facility: CLINIC | Age: 62
End: 2022-02-23

## 2022-02-23 VITALS
TEMPERATURE: 98.6 F | DIASTOLIC BLOOD PRESSURE: 90 MMHG | HEIGHT: 66 IN | SYSTOLIC BLOOD PRESSURE: 138 MMHG | WEIGHT: 190 LBS | BODY MASS INDEX: 30.53 KG/M2 | OXYGEN SATURATION: 98 % | HEART RATE: 86 BPM

## 2022-02-23 DIAGNOSIS — G89.29 CHRONIC MIDLINE LOW BACK PAIN WITH RIGHT-SIDED SCIATICA: Primary | ICD-10-CM

## 2022-02-23 DIAGNOSIS — M54.41 CHRONIC MIDLINE LOW BACK PAIN WITH RIGHT-SIDED SCIATICA: Primary | ICD-10-CM

## 2022-02-23 DIAGNOSIS — M62.838 MUSCLE SPASM: ICD-10-CM

## 2022-02-23 PROCEDURE — 99213 OFFICE O/P EST LOW 20 MIN: CPT | Performed by: NURSE PRACTITIONER

## 2022-02-23 PROCEDURE — 72100 X-RAY EXAM L-S SPINE 2/3 VWS: CPT | Performed by: NURSE PRACTITIONER

## 2022-02-23 RX ORDER — IBUPROFEN 800 MG/1
800 TABLET ORAL EVERY 6 HOURS PRN
Qty: 90 TABLET | Refills: 1 | Status: SHIPPED | OUTPATIENT
Start: 2022-02-23

## 2022-02-23 RX ORDER — BACLOFEN 10 MG/1
10 TABLET ORAL 2 TIMES DAILY PRN
Qty: 30 TABLET | Refills: 0 | Status: SHIPPED | OUTPATIENT
Start: 2022-02-23

## 2022-02-23 NOTE — PROGRESS NOTES
"Chief Complaint  Back Pain (2 years ago back popped)    Subjective          Paige Garnica presents to [facility]  History of Present Illness   61 yr old female pt of Dr. Quiroz, new to me, presenting for complaints of chronic low back pain, states has gotten worse since imaging and treatment back in Feb 2021, at that time she did PT, was given Flexeril and Ibuprofen, states back had improved for a bit. Her pain is at axial regional of low back and right gluteal, denies injury, pain worse with standing and bending, describes pain as dull ache with muscle spasms, pain 6/10, denies radiating pain or numbness and tingling  to LE.  Previous X-Ray on 2/22/21 showed disk space narrowing of L5-S1, will repeat Lumbar X-ray to determine need for MRI, PT referral, trail Baclofen and Ibuprofen, continuation of ice/heat therapy.     Objective   Vital Signs:   /90   Pulse 86   Temp 98.6 °F (37 °C)   Ht 167.6 cm (66\")   Wt 86.2 kg (190 lb)   SpO2 98%   BMI 30.67 kg/m²     Physical Exam  HENT:      Head: Normocephalic.   Cardiovascular:      Rate and Rhythm: Normal rate and regular rhythm.      Pulses: Normal pulses.      Heart sounds: Normal heart sounds.   Pulmonary:      Effort: Pulmonary effort is normal.      Breath sounds: Normal breath sounds.   Musculoskeletal:      Cervical back: Normal range of motion.      Lumbar back: Spasms and tenderness present.   Neurological:      General: No focal deficit present.      Mental Status: She is alert and oriented to person, place, and time.   Psychiatric:         Attention and Perception: Attention normal.         Thought Content: Thought content normal.        Result Review :              Lumbar X-Ray: no fracture, disc space narrowing of L5-S1, will confirm with radiology report.        Assessment and Plan    Diagnoses and all orders for this visit:    1. Chronic midline low back pain with right-sided sciatica (Primary)  -     XR Spine " Lumbar 2 or 3 View (In Office)  -     Ambulatory Referral to Physical Therapy Evaluate and treat  -     ibuprofen (ADVIL,MOTRIN) 800 MG tablet; Take 1 tablet by mouth Every 6 (Six) Hours As Needed for Mild Pain .  Dispense: 90 tablet; Refill: 1    2. Muscle spasm  -     XR Spine Lumbar 2 or 3 View (In Office)  -     Ambulatory Referral to Physical Therapy Evaluate and treat  -     baclofen (LIORESAL) 10 MG tablet; Take 1 tablet by mouth 2 (Two) Times a Day As Needed for Muscle Spasms.  Dispense: 30 tablet; Refill: 0        Follow Up   Return if symptoms worsen or fail to improve.  Patient was given instructions and counseling regarding her condition or for health maintenance advice. Please see specific information pulled into the AVS if appropriate.

## 2022-02-23 NOTE — PATIENT INSTRUCTIONS
Muscle Cramps and Spasms  Muscle cramps and spasms are when muscles tighten by themselves. They usually get better within minutes. Muscle cramps are painful. They are usually stronger and last longer than muscle spasms. Muscle spasms may or may not be painful. They can last a few seconds or much longer.  Cramps and spasms can affect any muscle, but they occur most often in the calf muscles of the leg. They are usually not caused by a serious problem. In many cases, the cause is not known. Some common causes include:  · Doing more physical work or exercise than your body is ready for.  · Using the muscles too much (overuse) by repeating certain movements too many times.  · Staying in a certain position for a long time.  · Playing a sport or doing an activity without preparing properly.  · Using bad form or technique while playing a sport or doing an activity.  · Not having enough water in your body (dehydration).  · Injury.  · Side effects of some medicines.  · Low levels of the salts and minerals in your blood (electrolytes), such as low potassium or calcium.  Follow these instructions at home:  Managing pain and stiffness         · Massage, stretch, and relax the muscle. Do this for many minutes at a time.  · If told, put heat on tight or tense muscles as often as told by your doctor. Use the heat source that your doctor recommends, such as a moist heat pack or a heating pad.  ? Place a towel between your skin and the heat source.  ? Leave the heat on for 20-30 minutes.  ? Remove the heat if your skin turns bright red. This is very important if you are not able to feel pain, heat, or cold. You may have a greater risk of getting burned.  · If told, put ice on the affected area. This may help if you are sore or have pain after a cramp or spasm.  ? Put ice in a plastic bag.  ? Place a towel between your skin and the bag.  ? Leave the ice on for 20 minutes, 2-3 times a day.  · Try taking hot showers or baths to help  "relax tight muscles.  Eating and drinking  · Drink enough fluid to keep your pee (urine) pale yellow.  · Eat a healthy diet to help ensure that your muscles work well. This should include:  ? Fruits and vegetables.  ? Lean protein.  ? Whole grains.  ? Low-fat or nonfat dairy products.  General instructions  · If you are having cramps often, avoid intense exercise for several days.  · Take over-the-counter and prescription medicines only as told by your doctor.  · Watch for any changes in your symptoms.  · Keep all follow-up visits as told by your doctor. This is important.  Contact a doctor if:  · Your cramps or spasms get worse or happen more often.  · Your cramps or spasms do not get better with time.  Summary  · Muscle cramps and spasms are when muscles tighten by themselves. They usually get better within minutes.  · Cramps and spasms occur most often in the calf muscles of the leg.  · Massage, stretch, and relax the muscle. This may help the cramp or spasm go away.  · Drink enough fluid to keep your pee (urine) pale yellow.  This information is not intended to replace advice given to you by your health care provider. Make sure you discuss any questions you have with your health care provider.  Document Revised: 05/13/2019 Document Reviewed: 05/13/2019  [employer] Patient Education © 2021 [employer]  https://doi.org/10.81300/[email or web address]\">   Chronic Back Pain  When back pain lasts longer than 3 months, it is called chronic back pain. The cause of your back pain may not be known. Some common causes include:  · Wear and tear (degenerative disease) of the bones, ligaments, or disks in your back.  · Inflammation and stiffness in your back (arthritis).  People who have chronic back pain often go through certain periods in which the pain is more intense (flare-ups). Many people can learn to manage the pain with home care.  Follow these instructions at home:  Pay attention to any changes in your symptoms. Take these " actions to help with your pain:  Managing pain and stiffness         · If directed, apply ice to the painful area. Your health care provider may recommend applying ice during the first 24-48 hours after a flare-up begins. To do this:  ? Put ice in a plastic bag.  ? Place a towel between your skin and the bag.  ? Leave the ice on for 20 minutes, 2-3 times per day.  · If directed, apply heat to the affected area as often as told by your health care provider. Use the heat source that your health care provider recommends, such as a moist heat pack or a heating pad.  ? Place a towel between your skin and the heat source.  ? Leave the heat on for 20-30 minutes.  ? Remove the heat if your skin turns bright red. This is especially important if you are unable to feel pain, heat, or cold. You may have a greater risk of getting burned.  · Try soaking in a warm tub.  Activity    · Avoid bending and other activities that make the problem worse.  · Maintain a proper position when standing or sitting:  ? When standing, keep your upper back and neck straight, with your shoulders pulled back. Avoid slouching.  ? When sitting, keep your back straight and relax your shoulders. Do not round your shoulders or pull them backward.  · Do not sit or  one place for long periods of time.  · Take brief periods of rest throughout the day. This will reduce your pain. Resting in a lying or standing position is usually better than sitting to rest.  · When you are resting for longer periods, mix in some mild activity or stretching between periods of rest. This will help to prevent stiffness and pain.  · Get regular exercise. Ask your health care provider what activities are safe for you.  · Do not lift anything that is heavier than 10 lb (4.5 kg), or the limit that you are told, until your health care provider says that it is safe. Always use proper lifting technique, which includes:  ? Bending your knees.  ? Keeping the load close to your  body.  ? Avoiding twisting.  · Sleep on a firm mattress in a comfortable position. Try lying on your side with your knees slightly bent. If you lie on your back, put a pillow under your knees.    Medicines  · Treatment may include medicines for pain and inflammation taken by mouth or applied to the skin, prescription pain medicine, or muscle relaxants. Take over-the-counter and prescription medicines only as told by your health care provider.  · Ask your health care provider if the medicine prescribed to you:  ? Requires you to avoid driving or using machinery.  ? Can cause constipation. You may need to take these actions to prevent or treat constipation:  § Drink enough fluid to keep your urine pale yellow.  § Take over-the-counter or prescription medicines.  § Eat foods that are high in fiber, such as beans, whole grains, and fresh fruits and vegetables.  § Limit foods that are high in fat and processed sugars, such as fried or sweet foods.  General instructions  · Do not use any products that contain nicotine or tobacco, such as cigarettes, e-cigarettes, and chewing tobacco. If you need help quitting, ask your health care provider.  · Keep all follow-up visits as told by your health care provider. This is important.  Contact a health care provider if:  · You have pain that is not relieved with rest or medicine.  · Your pain gets worse, or you have new pain.  · You have a high fever.  · You have rapid weight loss.  · You have trouble doing your normal activities.  Get help right away if:  · You have weakness or numbness in one or both of your legs or feet.  · You have trouble controlling your bladder or your bowels.  · You have severe back pain and have any of the following:  ? Nausea or vomiting.  ? Pain in your abdomen.  ? Shortness of breath or you faint.  Summary  · Chronic back pain is back pain that lasts longer than 3 months.  · When a flare-up begins, apply ice to the painful area for the first 24-48  hours.  · Apply a moist heat pad or use a heating pad on the painful area as directed by your health care provider.  · When you are resting for longer periods, mix in some mild activity or stretching between periods of rest. This will help to prevent stiffness and pain.  This information is not intended to replace advice given to you by your health care provider. Make sure you discuss any questions you have with your health care provider.  Document Revised: 01/27/2021 Document Reviewed: 01/27/2021  Elsevier Patient Education © 2021 [employer].

## 2022-02-24 ENCOUNTER — TELEPHONE (OUTPATIENT)
Dept: FAMILY MEDICINE CLINIC | Facility: CLINIC | Age: 62
End: 2022-02-24

## 2022-02-24 NOTE — TELEPHONE ENCOUNTER
Caller: Paige Garnica    Relationship: Self    Best call back number: [phone]      What is the best time to reach you: ANYTIME    Who are you requesting to speak with (clinical staff, provider,  specific staff member): DOCTOR LANE        What was the call regarding: SHE JUST ASKED FOR HIM TO CALL HER BACK.    Do you require a callback: YES

## 2022-02-24 NOTE — TELEPHONE ENCOUNTER
PATIENT WAS WANTING TO LET EWELINA KNOW THAT SHE SPOKE WITH INSURANCE AND THEY WILL COVER MRI IF SHE IS SENT TO GET IT DONE AT A PLACE THAT IS IN NETWORK.

## 2022-02-25 DIAGNOSIS — M54.41 CHRONIC LOW BACK PAIN WITH BILATERAL SCIATICA, UNSPECIFIED BACK PAIN LATERALITY: ICD-10-CM

## 2022-02-25 DIAGNOSIS — M51.36 LUMBAR DEGENERATIVE DISC DISEASE: Primary | ICD-10-CM

## 2022-02-25 DIAGNOSIS — G89.29 CHRONIC LOW BACK PAIN WITH BILATERAL SCIATICA, UNSPECIFIED BACK PAIN LATERALITY: ICD-10-CM

## 2022-02-25 DIAGNOSIS — M54.42 CHRONIC LOW BACK PAIN WITH BILATERAL SCIATICA, UNSPECIFIED BACK PAIN LATERALITY: ICD-10-CM

## 2022-02-28 ENCOUNTER — TELEPHONE (OUTPATIENT)
Dept: FAMILY MEDICINE CLINIC | Facility: CLINIC | Age: 62
End: 2022-02-28

## 2022-03-02 ENCOUNTER — HOME HEALTH ADMISSION (OUTPATIENT)
Dept: HOME HEALTH SERVICES | Facility: HOME HEALTHCARE | Age: 62
End: 2022-03-02

## 2022-03-02 DIAGNOSIS — G89.29 CHRONIC BILATERAL LOW BACK PAIN WITH BILATERAL SCIATICA: Primary | ICD-10-CM

## 2022-03-02 DIAGNOSIS — M54.42 CHRONIC BILATERAL LOW BACK PAIN WITH BILATERAL SCIATICA: Primary | ICD-10-CM

## 2022-03-02 DIAGNOSIS — M54.41 CHRONIC BILATERAL LOW BACK PAIN WITH BILATERAL SCIATICA: Primary | ICD-10-CM

## 2022-03-07 ENCOUNTER — TELEPHONE (OUTPATIENT)
Dept: FAMILY MEDICINE CLINIC | Facility: CLINIC | Age: 62
End: 2022-03-07

## 2022-03-11 ENCOUNTER — TELEPHONE (OUTPATIENT)
Dept: FAMILY MEDICINE CLINIC | Facility: CLINIC | Age: 62
End: 2022-03-11

## 2022-03-11 NOTE — TELEPHONE ENCOUNTER
Caller: GENET     Relationship to Patient: PHYSICAL THERAPIST     Phone Number: [phone]    Reason for Call: GENET FROM [facility] CALLED REQUESTING FOR ADDITIONAL ORDERS - PHYSICAL THERAPY TWICE A WEEK FOR 4 WEEKS, THEN ONCE A WEEK FOR 4 WEEKS

## 2022-03-15 ENCOUNTER — TELEPHONE (OUTPATIENT)
Dept: FAMILY MEDICINE CLINIC | Facility: CLINIC | Age: 62
End: 2022-03-15

## 2022-03-15 NOTE — TELEPHONE ENCOUNTER
Caller: Paige Garnica    Relationship to patient: Self    Best call back number:[phone] ([phone])    Patient is needing: PATIENT STATES THAT IN HER PROFESSION IT IS REQUIRED THAT THEY ATTEND A CONVENTION IN MAY. STATES SHE DOESN'T FEEL THAT SHE IS GOING TO BE UP TO GOING TO DO HER BACK INJURY. PATIENT WOULD LIKE TO KNOW IF SHE CAN GET A DOCTORS NOTE TOO EXCUSE HER FROM GOING. PATIENT WAS SEEN IN THE OFFICE FOR THE BACK INJURY ON 2/23/221.PATIENT STATES IF THE DO NOT ATTEND AND THEY DO NOT HAVE A DOCTORS NOT THEY WILL LOOSE ALL OF THERE BONUS MONEY. PATIENTS STATES THE NOTE CAN BE SENT VIA [employer]. PLEASE ADVISE. THANK YOU.

## 2022-03-16 ENCOUNTER — DOCUMENTATION (OUTPATIENT)
Dept: FAMILY MEDICINE CLINIC | Facility: CLINIC | Age: 62
End: 2022-03-16

## 2022-03-17 ENCOUNTER — OUTSIDE FACILITY SERVICE (OUTPATIENT)
Dept: FAMILY MEDICINE CLINIC | Facility: CLINIC | Age: 62
End: 2022-03-17

## 2022-03-17 PROCEDURE — OUTSIDEPOS PR OUTSIDE POS PLACEHOLDER: Performed by: FAMILY MEDICINE

## 2022-03-25 ENCOUNTER — HOSPITAL ENCOUNTER (OUTPATIENT)
Dept: MRI IMAGING | Facility: HOSPITAL | Age: 62
Discharge: HOME | End: 2022-03-25
Admitting: NURSE PRACTITIONER

## 2022-03-25 DIAGNOSIS — M54.42 CHRONIC LOW BACK PAIN WITH BILATERAL SCIATICA, UNSPECIFIED BACK PAIN LATERALITY: ICD-10-CM

## 2022-03-25 DIAGNOSIS — G89.29 CHRONIC LOW BACK PAIN WITH BILATERAL SCIATICA, UNSPECIFIED BACK PAIN LATERALITY: ICD-10-CM

## 2022-03-25 DIAGNOSIS — M51.36 LUMBAR DEGENERATIVE DISC DISEASE: ICD-10-CM

## 2022-03-25 DIAGNOSIS — M54.41 CHRONIC LOW BACK PAIN WITH BILATERAL SCIATICA, UNSPECIFIED BACK PAIN LATERALITY: ICD-10-CM

## 2022-03-25 PROCEDURE — 72148 MRI LUMBAR SPINE W/O DYE: CPT

## 2022-03-29 DIAGNOSIS — M54.41 CHRONIC BILATERAL LOW BACK PAIN WITH BILATERAL SCIATICA: ICD-10-CM

## 2022-03-29 DIAGNOSIS — M51.36 BULGING LUMBAR DISC: ICD-10-CM

## 2022-03-29 DIAGNOSIS — G89.29 CHRONIC BILATERAL LOW BACK PAIN WITH BILATERAL SCIATICA: ICD-10-CM

## 2022-03-29 DIAGNOSIS — M51.36 LUMBAR DEGENERATIVE DISC DISEASE: Primary | ICD-10-CM

## 2022-03-29 DIAGNOSIS — M54.42 CHRONIC BILATERAL LOW BACK PAIN WITH BILATERAL SCIATICA: ICD-10-CM

## 2022-04-01 ENCOUNTER — TELEPHONE (OUTPATIENT)
Dept: FAMILY MEDICINE CLINIC | Facility: CLINIC | Age: 62
End: 2022-04-01

## 2022-04-01 ENCOUNTER — TELEPHONE (OUTPATIENT)
Dept: PAIN MEDICINE | Facility: CLINIC | Age: 62
End: 2022-04-01

## 2022-04-01 NOTE — TELEPHONE ENCOUNTER
Patient would like to inform you of her allergy. She states that when she goes to her appointments normally she is sat in a different location than everyone else so she is no exposed to hand sanitizers and alcohol. Also when the provider comes in she would like if they washed their hands rather than use hand .

## 2022-04-01 NOTE — TELEPHONE ENCOUNTER
Caller: MARLI RENE    Relationship to patient: SELF    Best call back number: [phone]    Type of visit: NEW PT    Additional notes: PT WOULD LIKE A CALL BACK TO DISCUSS HER APPT FOR 6-6-22 WITH DR. SHAW. SHE STATED THAT SHE HAS A CHEMICAL INHALENT ALLERGY TO ISOPROPYL ALCOHOL & WANTS TO PREPARE FOR COMING IN TO HER APPOINTMENT.

## 2022-04-03 NOTE — TELEPHONE ENCOUNTER
This is a very burdensome request to honor, especially since hand  use is so pervasive, but also cleaning solutions used for procedures.  Allergy to alcohol is exceedingly rare, so does she have any allergy testing or documentation to confirm it?

## 2022-04-06 NOTE — PROGRESS NOTES
Subjective   History of Present Illness: Paige Garnica is a 61 y.o. female is being seen for consultation today at the request of TARYN Dodd for intermittent back pain that does not radiate.  This began a little over a year ago when she had a significant knee problem bilaterally and her back started aggravating her.  She got the knees treated with draining some fluid off the knees but the back pain did not improve.  She denies pain, numbness, tingling and weakness in her legs. She reports chronic issues with her neck and upper back as well. She has been to physical therapy, tried muscle relaxer's, massage therapy and tens unit.  She states that physical therapy has actually been helping and her pain level has gone down quite a bit in the back but it is not going.  She has had communication with [facility] about her allergy which has delayed the evaluation with pain management.    While in the room and during my examination of the patient I wore a mask and eye protection.  I washed my hands before and after this patient encounter.  The patient was also wearing a mask.    Back Pain  The problem occurs intermittently. The problem has been gradually worsening since onset. The pain is present in the lumbar spine. The quality of the pain is described as stabbing and shooting. The pain does not radiate. The symptoms are aggravated by position. Pertinent negatives include no bladder incontinence, bowel incontinence, numbness, tingling or weakness. She has tried muscle relaxant (massage therapy) for the symptoms.       The following portions of the patient's history were reviewed and updated as appropriate: allergies, current medications, past family history, past medical history, past social history, past surgical history and problem list.    Review of Systems   Constitutional: Positive for activity change.   Gastrointestinal: Negative for bowel incontinence.   Genitourinary: Negative for bladder  "incontinence.   Musculoskeletal: Positive for back pain.   Neurological: Negative for tingling, weakness and numbness.   Psychiatric/Behavioral: Negative for sleep disturbance.       Objective     Vitals:    04/14/22 1450   BP: 132/76   Weight: 86.2 kg (190 lb)   Height: 167.6 cm (66\")     Body mass index is 30.67 kg/m².      Physical Exam  Neurologic Exam    Physical Exam:    CONSTITUTIONAL: This 61 year old  female appears well developed, well-nourished and in no acute distress.    HEAD & FACE: the head and face are symmetric, normocephalic and atraumatic.    EYES: Inspection of the conjunctivae and lids reveals no swelling, erythema or discharge.  Pupils are round, equal and reactive to light and there is no scleral icterus.    EARS, NOSE, MOUTH & THROAT: On inspection, the ears and nose are within normal limits.    NECK: the neck is supple and symmetric. The trachea is midline with no masses.    PULMONARY: Respiratory effort is normal with no increased work of breathing or signs of respiratory distress.    CARDIOVASCULAR: Pedal pulses are +2/4 bilaterally. Examination of the extremities shows no edema or varicosities.    LYMPHATIC: There is no palpable lymphadenopathy of the neck.    MUSCULOSKELETAL: Gait and station are within normal limits. The spine has normal alignment and range of motion.  There is no palpable tenderness of the midline lumbar spine or SI joints bilaterally    SKIN: The skin is warm, dry and intact    NEUROLOGIC:   Cranial Nerves 2-12 intact  Normal motor strength noted. Muscle bulk and tone are normal.  Sensory exam is normal to fine touch to confrontational testing bilaterally  Reflexes on the right side demonstrates 1/4 Triceps Reflex, 1/4 Biceps Reflex, 1/4 Brachioradialis Reflex, 1/4 Knee Jerk Reflex, 0/4 Ankle Jerk Reflex and no ankle clonus on the right.   Reflexes on the left side demonstrates 1/4 Triceps Reflex, 1/4 Biceps Reflex, 1/4 Brachioradialis Reflex, 1/4 Knee Jerk " Reflex, 0/4 Ankle Jerk Reflex and no ankle clonus on the left.  Superficial/Primitive Reflexes: primitive reflexes were absent.  Moctezuma's, Babinski, and Clonus signs all negative.  No coordination deficit observed.  Radicular testing showed a negative Zhou (AZALEA) test and negative straight leg raise.  Cortical function is intact and without deficits. Speech is normal.    PSYCHIATRIC: oriented to person, place and time. Patient's mood and affect are normal.    Assessment/Plan   Independent Review of Radiographic Studies:      I personally reviewed the images from the following studies.    Lumbar radiographs done at [facility] on February 23, 2022 reveal no fracture or deformity.    MRI of the lumbar spine done on March 25, 2022 at [facility] reveal mild degenerative change throughout the lumbar spine with a slight amount of levoconvex scoliosis with the apex at L2-3.  Degenerative changes more advanced at L5-S1 but despite these findings only minimal disc bulge is present with minimal canal narrowing and minimal left neuroforaminal narrowing without cauda equina compromise or nerve root impingement.    Medical Decision Making:      From the neurosurgical perspective there is no surgical option for isolated back pain. There is no persistent radiculopathy or loss of nerve function on exam to justify surgery. Generally most people respond favorably to physical therapy, NSAIDs or oral steroids when appropriate and she has shown some improvement thus far. In difficult situations such as this one, pain management such as lumbar epidural steroids or facet rhizotomies are an option.     She is concerned that the pain management clinic at [facility] does not believe she has the hand  allergy so we will see if we can find a suitable option that can accommodate her allergy.    Since surgical pathology was not identified radiographically or clinically, we will initiate conservative treatment and only plan on  follow up as needed if surgical questions arise.    No follow-ups on file.    Diagnoses and all orders for this visit:    1. Chronic right-sided low back pain without sciatica (Primary)  -     Ambulatory Referral to Pain Management             William Lenz MD FACS FAANS  Neurological Surgery

## 2022-04-14 ENCOUNTER — OFFICE VISIT (OUTPATIENT)
Dept: NEUROSURGERY | Facility: CLINIC | Age: 62
End: 2022-04-14

## 2022-04-14 VITALS
DIASTOLIC BLOOD PRESSURE: 76 MMHG | WEIGHT: 190 LBS | SYSTOLIC BLOOD PRESSURE: 132 MMHG | BODY MASS INDEX: 30.53 KG/M2 | HEIGHT: 66 IN

## 2022-04-14 DIAGNOSIS — G89.29 CHRONIC RIGHT-SIDED LOW BACK PAIN WITHOUT SCIATICA: Primary | ICD-10-CM

## 2022-04-14 DIAGNOSIS — M54.50 CHRONIC RIGHT-SIDED LOW BACK PAIN WITHOUT SCIATICA: Primary | ICD-10-CM

## 2022-04-14 PROCEDURE — 99204 OFFICE O/P NEW MOD 45 MIN: CPT | Performed by: NEUROLOGICAL SURGERY

## 2022-04-19 ENCOUNTER — TELEPHONE (OUTPATIENT)
Dept: FAMILY MEDICINE CLINIC | Facility: CLINIC | Age: 62
End: 2022-04-19

## 2022-04-19 NOTE — TELEPHONE ENCOUNTER
[facility] pain and spine  [facility] [address]  [phone]   Faxing something over to us for the ref.   Dr Morelia Byrne

## 2022-04-20 NOTE — TELEPHONE ENCOUNTER
Neurosurgeon Dr. Lenz's office faxed referral request to [facility] for pt.  approval complete through PCP office, scanned into media and faxed to pain mgmt office. Pt informed

## 2022-05-04 ENCOUNTER — TELEPHONE (OUTPATIENT)
Dept: FAMILY MEDICINE CLINIC | Facility: CLINIC | Age: 62
End: 2022-05-04

## 2022-05-04 DIAGNOSIS — S03.00XA DISLOCATION OF TEMPOROMANDIBULAR JOINT, INITIAL ENCOUNTER: Primary | ICD-10-CM

## 2022-05-04 NOTE — TELEPHONE ENCOUNTER
Caller: Paige Garnica    Relationship: Self    Best call back number: [phone]  [phone]     What is the medical concern/diagnosis: TMJ     What specialty or service is being requested: DOCTOR FOR TMJ     What is the provider, practice or medical service name:  DR. YORDAN PERES     What is the office location: [facility]     What is the office phone number: [phone]     Any additional details:  RECOMMENDED BY DENTIST AND ORTHODONTIST .

## 2022-05-09 ENCOUNTER — TELEPHONE (OUTPATIENT)
Dept: FAMILY MEDICINE CLINIC | Facility: CLINIC | Age: 62
End: 2022-05-09

## 2022-05-09 NOTE — TELEPHONE ENCOUNTER
Caller: Paige Garnica    Relationship: Self    Best call back number: [phone]    What is the medical concern/diagnosis: LEFT KNEE PAIN     What specialty or service is being requested: ORTHOPEDIC     What is the provider, practice or medical service name: ANY, PATIENT PREFERS NOT TO SEE DOCTOR BRANDO SIEGEL       Any additional details: PLEASE CALL AND ADVISE

## 2022-05-09 NOTE — TELEPHONE ENCOUNTER
PATIENT CALLED AND WANTED A UPDATE OF HER REFERRAL FOR DOCTOR JA.     PLEASE CALL AND ADVISE.     CALLBACK NUMBER: [phone]

## 2022-05-10 DIAGNOSIS — G89.29 CHRONIC KNEE PAIN, UNSPECIFIED LATERALITY: Primary | ICD-10-CM

## 2022-05-10 DIAGNOSIS — M25.569 CHRONIC KNEE PAIN, UNSPECIFIED LATERALITY: Primary | ICD-10-CM

## 2022-07-07 ENCOUNTER — TELEPHONE (OUTPATIENT)
Dept: FAMILY MEDICINE CLINIC | Facility: CLINIC | Age: 62
End: 2022-07-07

## 2022-07-07 NOTE — TELEPHONE ENCOUNTER
Can only use over-the-counter medication.  If you have difficulty breathing needs to go to the ER for him.

## 2022-07-07 NOTE — TELEPHONE ENCOUNTER
Caller: Paige Garnica    Relationship to patient: Self    Best call back number: [phone]    Date of positive COVID19 test: 7/7/22 AT HOME TEST     COVID19 symptoms: VOMITING, CONGESTION, COUGH ,     Additional information or concerns: WANTING TO KNOW WHAT NEXT STEPS SHOULD BE PLEASE CALL AND ADVISE     What is the patients preferred pharmacy: [id number] [address] [address] AT [facility] OF [place] [address] & [place] [address] - [phone] [facility] [phone] FX

## 2022-09-19 ENCOUNTER — TELEPHONE (OUTPATIENT)
Dept: FAMILY MEDICINE CLINIC | Facility: CLINIC | Age: 62
End: 2022-09-19

## 2022-09-19 DIAGNOSIS — H04.121: Primary | ICD-10-CM

## 2022-09-19 NOTE — TELEPHONE ENCOUNTER
lucian     Caller: Paige Garnica    Relationship: Self    Best call back number: [phone]    What orders are you requesting (i.e. lab or imaging): REFERRAL TO EYE DOCTOR     In what timeframe would the patient need to come in: AS SOON AS POSSIBLE     Where will you receive your lab/imaging services: DR FITZPATRICK'S OFFICE IN [address], [phone]    Additional notes: PT HAS DRY EYE AND INSURANCE IS REQUIRING A REFERRAL

## 2022-10-14 ENCOUNTER — TELEPHONE (OUTPATIENT)
Dept: FAMILY MEDICINE CLINIC | Facility: CLINIC | Age: 62
End: 2022-10-14

## 2022-10-14 NOTE — TELEPHONE ENCOUNTER
Pain management should be the one to provide letter since they are treating her for pain.  I have not seen this patient since February.

## 2022-10-14 NOTE — TELEPHONE ENCOUNTER
Patient informed will need an appt. If none of other doctors will write note   Problem: MOBILITY - ADULT  Goal: Maintain or return to baseline ADL function  Description: INTERVENTIONS:  -  Assess patient's ability to carry out ADLs; assess patient's baseline for ADL function and identify physical deficits which impact ability to perform ADLs (bathing, care of mouth/teeth, toileting, grooming, dressing, etc )  - Assess/evaluate cause of self-care deficits   - Assess range of motion  - Assess patient's mobility; develop plan if impaired  - Assess patient's need for assistive devices and provide as appropriate  - Encourage maximum independence but intervene and supervise when necessary  - Involve family in performance of ADLs  - Assess for home care needs following discharge   - Consider OT consult to assist with ADL evaluation and planning for discharge  - Provide patient education as appropriate  Outcome: Progressing  Goal: Maintains/Returns to pre admission functional level  Description: INTERVENTIONS:  - Perform BMAT or MOVE assessment daily    - Set and communicate daily mobility goal to care team and patient/family/caregiver  - Collaborate with rehabilitation services on mobility goals if consulted  - Perform Range of Motion times a day  - Reposition patient every hours    - Dangle patient  times a day  - Stand patient times a day  - Ambulate patient times a day  - Out of bed to chair times a day   - Out of bed for meals times a day  - Out of bed for toileting  - Record patient progress and toleration of activity level   Outcome: Progressing

## 2022-10-14 NOTE — TELEPHONE ENCOUNTER
Spoke to patient told her to ask pain management/NeuroSurgeon for letter  or she needs an appointment here. Please advise, Ming has never seen patient.

## 2022-10-14 NOTE — TELEPHONE ENCOUNTER
Caller: Paige Garnica    Relationship: Self    Best call back number: [phone]    What form or medical record are you requesting: LETTER    Who is requesting this form or medical record from you: EMPLOYER    How would you like to receive the form or medical records (pick-up, mail, fax): PUT IN [employer]    Timeframe paperwork needed: AS SOON AS POSSIBLE.    Additional notes: PATIENT STATES THAT SHE NEEDS A LETTER FROM DR. SOUZA THAT SHE IS UNABLE TO TRAVEL TO GIVE TO HER EMPLOYER. PATIENT SHE REQUESTS THE LETTER BE PUT IN HER [employer] ACCOUNT.    PLEASE ADVISE.

## 2022-10-14 NOTE — TELEPHONE ENCOUNTER
Patient cant stand or travel due to severe pain when standing walking cant go through airport or sit in car for long time. Is in pain clinic

## 2022-11-16 ENCOUNTER — OFFICE VISIT (OUTPATIENT)
Dept: FAMILY MEDICINE CLINIC | Facility: CLINIC | Age: 62
End: 2022-11-16

## 2022-11-16 VITALS
BODY MASS INDEX: 29.22 KG/M2 | TEMPERATURE: 97.8 F | DIASTOLIC BLOOD PRESSURE: 70 MMHG | HEIGHT: 66 IN | SYSTOLIC BLOOD PRESSURE: 116 MMHG | OXYGEN SATURATION: 98 % | HEART RATE: 100 BPM | WEIGHT: 181.8 LBS

## 2022-11-16 DIAGNOSIS — C44.00 SKIN CANCER OF LIP: ICD-10-CM

## 2022-11-16 DIAGNOSIS — Z12.11 SCREENING FOR COLON CANCER: ICD-10-CM

## 2022-11-16 DIAGNOSIS — R00.0 TACHYCARDIA: ICD-10-CM

## 2022-11-16 DIAGNOSIS — Z13.220 SCREENING CHOLESTEROL LEVEL: ICD-10-CM

## 2022-11-16 DIAGNOSIS — Z86.39 HISTORY OF HYPOTHYROIDISM: ICD-10-CM

## 2022-11-16 DIAGNOSIS — Z80.0 FAMILY HISTORY OF COLON CANCER IN FATHER: ICD-10-CM

## 2022-11-16 DIAGNOSIS — C44.91 BASAL CELL CARCINOMA (BCC), UNSPECIFIED SITE: ICD-10-CM

## 2022-11-16 DIAGNOSIS — Z00.00 ANNUAL PHYSICAL EXAM: Primary | ICD-10-CM

## 2022-11-16 DIAGNOSIS — M62.838 MUSCLE SPASMS OF NECK: ICD-10-CM

## 2022-11-16 DIAGNOSIS — M25.562 CHRONIC PAIN OF LEFT KNEE: ICD-10-CM

## 2022-11-16 DIAGNOSIS — G89.29 CHRONIC PAIN OF LEFT KNEE: ICD-10-CM

## 2022-11-16 DIAGNOSIS — M25.569 KNEE PAIN, UNSPECIFIED CHRONICITY, UNSPECIFIED LATERALITY: ICD-10-CM

## 2022-11-16 PROCEDURE — 99213 OFFICE O/P EST LOW 20 MIN: CPT | Performed by: STUDENT IN AN ORGANIZED HEALTH CARE EDUCATION/TRAINING PROGRAM

## 2022-11-16 PROCEDURE — 99396 PREV VISIT EST AGE 40-64: CPT | Performed by: STUDENT IN AN ORGANIZED HEALTH CARE EDUCATION/TRAINING PROGRAM

## 2022-11-16 RX ORDER — DOXYCYCLINE HYCLATE 100 MG/1
100 CAPSULE ORAL 2 TIMES DAILY
COMMUNITY
Start: 2022-11-08

## 2022-11-16 RX ORDER — MOMETASONE FUROATE 50 UG/1
2 SPRAY, METERED NASAL DAILY
COMMUNITY

## 2022-11-16 NOTE — PATIENT INSTRUCTIONS
Thank you for allowing us to participate in your care.  Please call with any questions or concerns.

## 2022-11-16 NOTE — PROGRESS NOTES
Chief Complaint  Knee pain    Subjective        Paige Garnica presents to [facility]  History of Present Illness    Previous provider has mostly retired, now is only working on wednesdays so she cannot really get into see him.   Left knee pain is really bothering her. Has done therapy as well as drainage on her knees. Pain management asks for referral for her to go [facility] at [address] [place] [address]. Specify Dr. Errol Zayas for evaluate and treat left knee pain.     Only takes muscle relaxer less than monthly but will occasionally have to take some for a week or two when she has an episode.     Working on nerve ablations for spinal pain. Has had one side completed and is now waiting for the other side. Referral has already been placed.    Had previous problems with her right shoulder but those have resolved.     Does have history of previous basal cell cancers. Does see a dermatologist.    Heart rate does run on the higher end of normal. Attributes that to not getting much exercise.  Tried to do palates and hurt her back and knee.     Lost her  to MI unexpectedly at the beginning of the pandemic.     Taking doxycyline for infected oil gland in her left eye. Was previously using an antibiotic and steroid ointment so transitioned to oral antibiotics. Stopped supplements with calcium while her eye clears up.     Son is a nurse practitioner and discussed with her getting botox in her neck which may help her. Does occasionally have TMJ from her neck muscles staying tight.     Did have a history of postpartum hypothyroid but seemed to improved after two years.     Preventative:  -Colonoscopy never completed, agrees to referral today; father had colon cancer in his 70s; no bleeding in stool or unexpected weight loss. Does have diverticulitis about one month ago.   -Mammogram: declines due to concerns with radiation from mammogram causing cancer; no family  "history of breast cancer. Did have a mammogram before but had a bad experience.   -declines influenza and COVID vaccines   -pap smear: due but not ready to do it at this time     Objective   Vital Signs:  /70 (BP Location: Left arm, Patient Position: Sitting, Cuff Size: Adult)   Pulse 100   Temp 97.8 °F (36.6 °C)   Ht 167.6 cm (66\")   Wt 82.5 kg (181 lb 12.8 oz)   SpO2 98%   BMI 29.34 kg/m²   Estimated body mass index is 29.34 kg/m² as calculated from the following:    Height as of this encounter: 167.6 cm (66\").    Weight as of this encounter: 82.5 kg (181 lb 12.8 oz).          Physical Exam  Vitals and nursing note reviewed.   Constitutional:       General: She is not in acute distress.     Appearance: Normal appearance. She is not ill-appearing.   HENT:      Head: Normocephalic and atraumatic.      Nose: Nose normal.      Mouth/Throat:      Mouth: Mucous membranes are moist.   Eyes:      Extraocular Movements: Extraocular movements intact.      Conjunctiva/sclera: Conjunctivae normal.   Cardiovascular:      Rate and Rhythm: Normal rate and regular rhythm.      Heart sounds: Normal heart sounds. No murmur heard.    No gallop.   Pulmonary:      Effort: Pulmonary effort is normal. No respiratory distress.      Breath sounds: Normal breath sounds. No stridor. No wheezing, rhonchi or rales.   Chest:      Chest wall: No tenderness.   Skin:     General: Skin is warm and dry.   Neurological:      General: No focal deficit present.      Mental Status: She is alert and oriented to person, place, and time. Mental status is at baseline.   Psychiatric:         Mood and Affect: Mood normal.         Behavior: Behavior normal.        Result Review :                Assessment and Plan   Diagnoses and all orders for this visit:    1. Annual physical exam (Primary)  -     Comprehensive metabolic panel; Future  -     TSH Rfx On Abnormal To Free T4; Future  -     Lipid panel; Future    2. Knee pain, unspecified " chronicity, unspecified laterality  Comments:  referral to neuromuscular medicine to help with pain     3. Chronic pain of left knee  -     Ambulatory Referral to Neurology    4. Basal cell carcinoma (BCC), unspecified site    5. Skin cancer of lip  Comments:  topical chemo with derm this winter     6. Muscle spasms of neck  -     Ambulatory Referral to Massage Therapy  -     Ambulatory Referral to Neurology    7. Screening for colon cancer  -     Ambulatory Referral For Screening Colonoscopy    8. Family history of colon cancer in father  -     Ambulatory Referral For Screening Colonoscopy    9. History of hypothyroidism  -     TSH Rfx On Abnormal To Free T4; Future    10. Tachycardia  -     TSH Rfx On Abnormal To Free T4; Future    11. Screening cholesterol level  -     Lipid panel; Future    Declines flu and COVID vaccines. Prefers to avoid mammogram. Will send for colonoscopy.  Will refer to neuromuscular medicine and for massage therapy for knee pain and neck pain.   Will screen thyroid today given hx of hypothyroidism in the postpartum period.   Due for cholesterol screening as well.          Follow Up   Return in about 1 year (around 11/16/2023).  Patient was given instructions and counseling regarding her condition or for health maintenance advice. Please see specific information pulled into the AVS if appropriate.

## 2023-01-06 ENCOUNTER — TELEPHONE (OUTPATIENT)
Dept: FAMILY MEDICINE CLINIC | Facility: CLINIC | Age: 63
End: 2023-01-06
Payer: OTHER GOVERNMENT

## 2023-01-06 NOTE — TELEPHONE ENCOUNTER
Patient called about her neurology referral. She stated it was supposed to be for both knees and not just the left. Also there was a discrepancy with the codes. She gave the number for Jeremias # at the facility.

## 2023-01-09 NOTE — TELEPHONE ENCOUNTER
Spoke with patient and let her know that someone in the referral department would be reaching out to her.

## 2023-04-03 ENCOUNTER — PRE-PROCEDURE SCREENING (OUTPATIENT)
Dept: GASTROENTEROLOGY | Facility: CLINIC | Age: 63
End: 2023-04-03
Payer: OTHER GOVERNMENT

## 2023-04-03 NOTE — TELEPHONE ENCOUNTER
LAST SCOPE 4/13  IN Epic --Personal history of polyps--Family history of polyps or colon cancer--IBUPROFEN--Medications:                  baclofen (LIORESAL) 10 MG tablet  coenzyme Q10 100 MG capsule  doxycycline (VIBRAMYCIN) 100 MG capsule  EPINEPHrine (EPIPEN) 0.3 MG/0.3ML solution auto-injector injection  EPINEPHrine (Primatene Mist) 0.125 MG/ACT aerosol  glucosamine-chondroitin 500-400 MG capsule capsule  ibuprofen (ADVIL,MOTRIN) 800 MG tablet    LORATADINE PO  Misc Natural Products (GRAPE SEED COMPLEX PO)    mometasone (NASONEX) 50 MCG/ACT nasal spray  Multiple Vitamins-Minerals (MULTIVITAMIN WITH MINERALS) tablet tablet  Omega-3 1000 MG capsule  Probiotic Product (PROBIOTIC DAILY PO)        QUESTIONNAIRE SCREENING  SCAN IN  MEDIA & HAS BEEN SENT TO DOCTOR FOR REVIEW

## 2023-04-08 ENCOUNTER — PREP FOR SURGERY (OUTPATIENT)
Dept: OTHER | Facility: HOSPITAL | Age: 63
End: 2023-04-08
Payer: OTHER GOVERNMENT

## 2023-04-08 DIAGNOSIS — K63.5 COLON POLYPS: Primary | ICD-10-CM

## 2023-05-04 ENCOUNTER — TELEPHONE (OUTPATIENT)
Dept: GASTROENTEROLOGY | Facility: CLINIC | Age: 63
End: 2023-05-04
Payer: OTHER GOVERNMENT

## 2023-05-04 NOTE — TELEPHONE ENCOUNTER
OK FOR HUB TO READ   PT IS KRISTEN FOR A COLONOSCOPY AT [facility] PT WAS MAILED [facility] PREP  PT IS AWARE [facility] JANELL CALL PT KRISTEN IN [facility] AND CST

## 2023-05-05 ENCOUNTER — PREP FOR SURGERY (OUTPATIENT)
Dept: OTHER | Facility: HOSPITAL | Age: 63
End: 2023-05-05
Payer: OTHER GOVERNMENT

## 2023-05-05 ENCOUNTER — TELEPHONE (OUTPATIENT)
Dept: GASTROENTEROLOGY | Facility: CLINIC | Age: 63
End: 2023-05-05
Payer: OTHER GOVERNMENT

## 2023-05-05 DIAGNOSIS — R68.81 EARLY SATIETY: Primary | ICD-10-CM

## 2023-05-05 NOTE — TELEPHONE ENCOUNTER
OK FOR HUB TO READ   PT IS KRISTEN FOR A COLONOSCOPY AND EGD AT [facility] PT WAS MAILED [facility] PREP  PT IS AWARE [facility] EILL CALL PT KRISTEN IN [facility] AND CST

## 2023-05-23 ENCOUNTER — TELEMEDICINE (OUTPATIENT)
Dept: FAMILY MEDICINE CLINIC | Facility: CLINIC | Age: 63
End: 2023-05-23
Payer: OTHER GOVERNMENT

## 2023-05-23 DIAGNOSIS — L56.8 ACTINIC CHEILITIS: Primary | ICD-10-CM

## 2023-05-23 DIAGNOSIS — Z88.9 MULTIPLE ALLERGIES: ICD-10-CM

## 2023-05-23 RX ORDER — EPINEPHRINE INHALATION 125 UG/1
1 AEROSOL RESPIRATORY (INHALATION) AS NEEDED
Qty: 11.7 G | Refills: 0 | Status: SHIPPED | OUTPATIENT
Start: 2023-05-23

## 2023-05-23 NOTE — PROGRESS NOTES
"This was an audio and video enabled telemedicine encounter.  Patient location: home address as in chart  Physician location: [place] [address]   Start time: 2:00 pm   End time: 2:10 pm  Total time of encounter: 10 mins    You have chosen to receive care through a telephone visit. Do you consent to use a telephone visit for your medical care today? Yes      Chief Complaint  Actinic cheilitis     Subjective        Paige Garnica presents to [facility]  History of Present Illness     Needs aerosal for her MCS. When she gets exposed to isopropyl alcohol she has issues with her throat swelling and needs a refill.     primitine myst .0125g mg/act    Has colonoscopy scheduled for June with Dr. Song.     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  Plans to get tetanus shot.     Objective   Vital Signs:  There were no vitals taken for this visit.  Estimated body mass index is 29.34 kg/m² as calculated from the following:    Height as of 11/16/22: 167.6 cm (66\").    Weight as of 11/16/22: 82.5 kg (181 lb 12.8 oz).             Physical Exam  Constitutional:       Appearance: Normal appearance.   HENT:      Head: Normocephalic and atraumatic.      Nose: Nose normal.   Eyes:      Extraocular Movements: Extraocular movements intact.      Conjunctiva/sclera: Conjunctivae normal.   Pulmonary:      Effort: Pulmonary effort is normal.   Neurological:      Mental Status: She is alert.   Psychiatric:         Mood and Affect: Mood normal.         Behavior: Behavior normal.        Result Review :                   Assessment and Plan   Diagnoses and all orders for this visit:    1. Actinic cheilitis (Primary)  -     Cancel: Ambulatory Referral to Dermatology  -     Ambulatory Referral to Dermatology    2. Multiple allergies  -     EPINEPHrine (Primatene Mist) 0.125 MG/ACT aerosol; Inhale 1 spray As Needed (exposure to allergen).  Dispense: 11.7 g; Refill: 0             Follow Up   Return in about " 6 months (around 11/23/2023) for Annual physical.  Patient was given instructions and counseling regarding her condition or for health maintenance advice. Please see specific information pulled into the AVS if appropriate.

## 2023-06-08 ENCOUNTER — TELEPHONE (OUTPATIENT)
Dept: FAMILY MEDICINE CLINIC | Facility: CLINIC | Age: 63
End: 2023-06-08
Payer: OTHER GOVERNMENT

## 2023-06-08 NOTE — TELEPHONE ENCOUNTER
Patient called and wanted to let you know that she called [facility] and told her that Dr. Santos wasn't her primary care provider and the patient stated  needs and Letter of Acceptance for being her provider so she can get her referral to get approved.

## 2023-06-13 ENCOUNTER — TELEPHONE (OUTPATIENT)
Dept: FAMILY MEDICINE CLINIC | Facility: CLINIC | Age: 63
End: 2023-06-13
Payer: OTHER GOVERNMENT

## 2023-06-13 NOTE — TELEPHONE ENCOUNTER
[facility] is telling Dr Song's office that they have not received the request for authorization for this patients colonoscopy, Patient has started her prep & Duncan's office called her today to let her know. Is there a way to get in touch with [facility] & get an authorization? Please call patient to let her know

## 2023-06-14 ENCOUNTER — TELEPHONE (OUTPATIENT)
Dept: GASTROENTEROLOGY | Facility: CLINIC | Age: 63
End: 2023-06-14
Payer: OTHER GOVERNMENT

## 2023-06-14 PROBLEM — R68.81 EARLY SATIETY: Status: ACTIVE | Noted: 2023-06-14

## 2023-06-14 PROBLEM — K63.5 COLON POLYPS: Status: ACTIVE | Noted: 2023-06-14

## 2023-06-14 NOTE — TELEPHONE ENCOUNTER
ROSEMARY patient via telephone for. Rescheduled COLONOSCOPY/EGD 10- @[facility] with arrival time of 12:30PM. Prep paperwork mailed to verified address on file. Patient advised arrival time may change based on [facility] guidelines. ROSEMARY DELUNA informed scheduling patient has chemical allergy    Disinfectant Products MiscAnaphylaxis  Hand  [Flavoring Agent]Anaphylaxis  Isopropyl AlcoholAnaphylaxis  Phenergan [Promethazine Hcl]Anaphylaxis

## 2023-09-06 ENCOUNTER — TELEPHONE (OUTPATIENT)
Dept: FAMILY MEDICINE CLINIC | Facility: CLINIC | Age: 63
End: 2023-09-06
Payer: OTHER GOVERNMENT

## 2023-09-06 NOTE — TELEPHONE ENCOUNTER
Patient called stating that she needs a referral for further testing of her spine?  She is not sure what the referral is supposed to say.

## 2023-09-08 ENCOUNTER — TELEPHONE (OUTPATIENT)
Dept: FAMILY MEDICINE CLINIC | Facility: CLINIC | Age: 63
End: 2023-09-08

## 2023-09-08 NOTE — TELEPHONE ENCOUNTER
Caller: Paige Garnica    Relationship: Self    Best call back number:   [phone]  What form or medical record are you requesting: MEDICAL EXCUSE NOTE     How would you like to receive the form or medical records (pick-up, mail, fax):      Timeframe paperwork needed: ASAP     Additional notes: PATIENT STATES THAT SHE HAS A WORK TRIP THAT SHE WILL NOT BE ABLE TO GO TO. PATIENT STATES THIS IS DUE TO HER BACK PAIN. PATIENT WOULD LIKE TO KNOW IF DR. RICHARD CAN WRITE THIS NOTE FOR HER AS AN EXCUSE AND HAVE IT PICKED UP.

## 2023-09-11 ENCOUNTER — TELEPHONE (OUTPATIENT)
Dept: FAMILY MEDICINE CLINIC | Facility: CLINIC | Age: 63
End: 2023-09-11
Payer: OTHER GOVERNMENT

## 2023-09-11 DIAGNOSIS — H00.19 CHALAZION, UNSPECIFIED LATERALITY: Primary | ICD-10-CM

## 2023-09-11 NOTE — TELEPHONE ENCOUNTER
The patient called and said that she is experiencing Chalazion again. She said she had it earlier in the year. She is needing another referral to her eye surgeon Dr. Don Kothari. Please advise.

## 2023-09-12 NOTE — TELEPHONE ENCOUNTER
Left message that referral placed and that referral department will contact patient with appointment info.

## 2023-09-12 NOTE — TELEPHONE ENCOUNTER
Lvmtcb okay for  and hub to read     If patient wants a referral to a specialty we can send that in if she is wanting imaging done she will need an appointment patient should also make and appointment with the soonest available provider to get the work note.

## 2023-09-19 ENCOUNTER — OFFICE VISIT (OUTPATIENT)
Dept: FAMILY MEDICINE CLINIC | Facility: CLINIC | Age: 63
End: 2023-09-19
Payer: OTHER GOVERNMENT

## 2023-09-19 VITALS
WEIGHT: 180 LBS | DIASTOLIC BLOOD PRESSURE: 92 MMHG | SYSTOLIC BLOOD PRESSURE: 142 MMHG | HEART RATE: 91 BPM | OXYGEN SATURATION: 98 % | BODY MASS INDEX: 29.05 KG/M2 | TEMPERATURE: 97.8 F

## 2023-09-19 DIAGNOSIS — M47.816 LUMBAR SPONDYLOSIS: Primary | ICD-10-CM

## 2023-09-19 DIAGNOSIS — M54.50 CHRONIC RIGHT-SIDED LOW BACK PAIN WITHOUT SCIATICA: ICD-10-CM

## 2023-09-19 DIAGNOSIS — G89.29 CHRONIC RIGHT-SIDED LOW BACK PAIN WITHOUT SCIATICA: ICD-10-CM

## 2023-09-19 DIAGNOSIS — Z00.00 WELLNESS EXAMINATION: ICD-10-CM

## 2023-09-19 DIAGNOSIS — Z13.220 SCREENING CHOLESTEROL LEVEL: ICD-10-CM

## 2023-09-19 PROBLEM — M54.2 NECK PAIN: Status: ACTIVE | Noted: 2022-12-20

## 2023-09-19 PROBLEM — M51.369 DEGENERATION OF LUMBAR INTERVERTEBRAL DISC: Status: ACTIVE | Noted: 2023-09-19

## 2023-09-19 PROBLEM — F43.10 POSTTRAUMATIC STRESS DISORDER: Status: ACTIVE | Noted: 2022-12-20

## 2023-09-19 PROBLEM — M17.0 OSTEOARTHRITIS OF BOTH KNEES: Status: ACTIVE | Noted: 2022-12-20

## 2023-09-19 PROBLEM — M51.36 DEGENERATION OF LUMBAR INTERVERTEBRAL DISC: Status: ACTIVE | Noted: 2023-09-19

## 2023-09-19 NOTE — LETTER
September 19, 2023     Patient: Paige Garnica   YOB: 1960   Date of Visit: 9/19/2023       To Whom It May Concern:    It is my medical opinion that Paige Garnica may return to work today. However, she should restrict travel for work given her immense pain with standing and lifting that prevents her from being able to wait in lines at the airport, lift her luggage, or sit in planes for prolonged periods of time.          Sincerely,        Bettina Santos,     CC: No Recipients

## 2023-09-19 NOTE — PROGRESS NOTES
"Chief Complaint  Back Pain    Subjective        Paige Garnica presents to [facility]  History of Present Illness    Answers submitted by the patient for this visit:  Primary Reason for Visit (Submitted on 9/18/2023)  What is the primary reason for your visit?: Back Pain    Still going to pain clinic. Had ablation lumbosacral and left side of cervical. Has next set of injections for her cervical spine scheduled. After things settled with the lumbosacral region another area of pain popped up above that area. Spoke with her doctor that does PRP and was told that it was common for another spot to pop up. Was suppose to go on a cruise in November. Feels ok with walking but cannot stand for long at all. Really has pain after about 5 mins of standing. Can sit or lay and get out of pain. Has ot use a motorized cart at the hardware store or grocery store. Her son came home from [facility] to help her care for things.   Going to formal PT is difficult for her as she is so allergic to hand . Hasn't had PT since injections. Still has printouts of exercises but hasn't done them as much as she should be.     Needs a note for work saying that at this time she cannot travel as it would be too much on her body. Usually travels twice per year for work. Feels she cannot stand long enough to go on air planes. Cannot life her luggage. Feels miserable for days after standing for prolonged periods.     Has her next appointment with pain clinic on Thursday.     Has colonoscopy scheduled for October.     Objective   Vital Signs:  /92 (BP Location: Right arm, Patient Position: Sitting, Cuff Size: Adult)   Pulse 91   Temp 97.8 °F (36.6 °C)   Wt 81.6 kg (180 lb)   SpO2 98%   BMI 29.05 kg/m²   Estimated body mass index is 29.05 kg/m² as calculated from the following:    Height as of 11/16/22: 167.6 cm (66\").    Weight as of this encounter: 81.6 kg (180 lb).             Physical Exam  Vitals and " nursing note reviewed.   Constitutional:       General: She is not in acute distress.     Appearance: Normal appearance. She is not ill-appearing.   HENT:      Head: Normocephalic and atraumatic.      Nose: Nose normal.      Mouth/Throat:      Mouth: Mucous membranes are moist.   Eyes:      Extraocular Movements: Extraocular movements intact.      Conjunctiva/sclera: Conjunctivae normal.   Cardiovascular:      Rate and Rhythm: Normal rate and regular rhythm.      Heart sounds: Normal heart sounds. No murmur heard.    No gallop.   Pulmonary:      Effort: Pulmonary effort is normal. No respiratory distress.      Breath sounds: Normal breath sounds. No stridor. No wheezing, rhonchi or rales.   Chest:      Chest wall: No tenderness.   Skin:     General: Skin is warm and dry.   Neurological:      General: No focal deficit present.      Mental Status: She is alert and oriented to person, place, and time. Mental status is at baseline.   Psychiatric:         Mood and Affect: Mood normal.         Behavior: Behavior normal.      Result Review :                   Assessment and Plan   Diagnoses and all orders for this visit:    1. Lumbar spondylosis (Primary)    2. Chronic right-sided low back pain without sciatica    3. Screening cholesterol level  -     Lipid panel    4. Wellness examination  -     Comprehensive metabolic panel    Note to avoid work travel provided for patient today.  She will continue seeing pain management for injections.          Follow Up   Return if symptoms worsen or fail to improve.  Patient was given instructions and counseling regarding her condition or for health maintenance advice. Please see specific information pulled into the AVS if appropriate.

## 2023-09-20 LAB
ALBUMIN SERPL-MCNC: 4.7 G/DL (ref 3.9–4.9)
ALBUMIN/GLOB SERPL: 2 {RATIO} (ref 1.2–2.2)
ALP SERPL-CCNC: 119 IU/L (ref 44–121)
ALT SERPL-CCNC: 30 IU/L (ref 0–32)
AST SERPL-CCNC: 27 IU/L (ref 0–40)
BILIRUB SERPL-MCNC: 0.4 MG/DL (ref 0–1.2)
BUN SERPL-MCNC: 12 MG/DL (ref 8–27)
BUN/CREAT SERPL: 15 (ref 12–28)
CALCIUM SERPL-MCNC: 9.9 MG/DL (ref 8.7–10.3)
CHLORIDE SERPL-SCNC: 101 MMOL/L (ref 96–106)
CHOLEST SERPL-MCNC: 223 MG/DL (ref 100–199)
CO2 SERPL-SCNC: 26 MMOL/L (ref 20–29)
CREAT SERPL-MCNC: 0.79 MG/DL (ref 0.57–1)
EGFRCR SERPLBLD CKD-EPI 2021: 85 ML/MIN/1.73
GLOBULIN SER CALC-MCNC: 2.4 G/DL (ref 1.5–4.5)
GLUCOSE SERPL-MCNC: 78 MG/DL (ref 70–99)
HDLC SERPL-MCNC: 58 MG/DL
LDLC SERPL CALC-MCNC: 143 MG/DL (ref 0–99)
POTASSIUM SERPL-SCNC: 4.5 MMOL/L (ref 3.5–5.2)
PROT SERPL-MCNC: 7.1 G/DL (ref 6–8.5)
SODIUM SERPL-SCNC: 140 MMOL/L (ref 134–144)
TRIGL SERPL-MCNC: 122 MG/DL (ref 0–149)
VLDLC SERPL CALC-MCNC: 22 MG/DL (ref 5–40)

## 2023-10-12 ENCOUNTER — TELEPHONE (OUTPATIENT)
Dept: GASTROENTEROLOGY | Facility: CLINIC | Age: 63
End: 2023-10-12
Payer: OTHER GOVERNMENT

## 2023-10-19 ENCOUNTER — ANESTHESIA EVENT (OUTPATIENT)
Dept: GASTROENTEROLOGY | Facility: HOSPITAL | Age: 63
End: 2023-10-19
Payer: OTHER GOVERNMENT

## 2023-10-19 ENCOUNTER — ANESTHESIA (OUTPATIENT)
Dept: GASTROENTEROLOGY | Facility: HOSPITAL | Age: 63
End: 2023-10-19
Payer: OTHER GOVERNMENT

## 2023-10-19 ENCOUNTER — HOSPITAL ENCOUNTER (OUTPATIENT)
Facility: HOSPITAL | Age: 63
Setting detail: HOSPITAL OUTPATIENT SURGERY
Discharge: HOME | End: 2023-10-19
Attending: INTERNAL MEDICINE | Admitting: INTERNAL MEDICINE
Payer: OTHER GOVERNMENT

## 2023-10-19 VITALS
SYSTOLIC BLOOD PRESSURE: 116 MMHG | OXYGEN SATURATION: 96 % | BODY MASS INDEX: 28.45 KG/M2 | WEIGHT: 177 LBS | DIASTOLIC BLOOD PRESSURE: 66 MMHG | HEIGHT: 66 IN | RESPIRATION RATE: 20 BRPM | HEART RATE: 83 BPM

## 2023-10-19 DIAGNOSIS — K63.5 COLON POLYPS: ICD-10-CM

## 2023-10-19 PROCEDURE — 45380 COLONOSCOPY AND BIOPSY: CPT | Performed by: INTERNAL MEDICINE

## 2023-10-19 PROCEDURE — 45385 COLONOSCOPY W/LESION REMOVAL: CPT | Performed by: INTERNAL MEDICINE

## 2023-10-19 PROCEDURE — 43235 EGD DIAGNOSTIC BRUSH WASH: CPT | Performed by: INTERNAL MEDICINE

## 2023-10-19 PROCEDURE — S0260 H&P FOR SURGERY: HCPCS | Performed by: INTERNAL MEDICINE

## 2023-10-19 PROCEDURE — 88305 TISSUE EXAM BY PATHOLOGIST: CPT | Performed by: INTERNAL MEDICINE

## 2023-10-19 PROCEDURE — 25810000003 LACTATED RINGERS PER 1000 ML: Performed by: REGISTERED NURSE

## 2023-10-19 PROCEDURE — 25010000002 PROPOFOL 10 MG/ML EMULSION: Performed by: REGISTERED NURSE

## 2023-10-19 DEVICE — DEV CLIP ENDO RESOLUTION360 CONTRL ROT 235CM: Type: IMPLANTABLE DEVICE | Site: CECUM | Status: FUNCTIONAL

## 2023-10-19 RX ORDER — PROPOFOL 10 MG/ML
VIAL (ML) INTRAVENOUS AS NEEDED
Status: DISCONTINUED | OUTPATIENT
Start: 2023-10-19 | End: 2023-10-19 | Stop reason: SURG

## 2023-10-19 RX ORDER — SODIUM CHLORIDE, SODIUM LACTATE, POTASSIUM CHLORIDE, CALCIUM CHLORIDE 600; 310; 30; 20 MG/100ML; MG/100ML; MG/100ML; MG/100ML
INJECTION, SOLUTION INTRAVENOUS CONTINUOUS PRN
Status: DISCONTINUED | OUTPATIENT
Start: 2023-10-19 | End: 2023-10-19 | Stop reason: SURG

## 2023-10-19 RX ORDER — LIDOCAINE HYDROCHLORIDE 20 MG/ML
INJECTION, SOLUTION INFILTRATION; PERINEURAL AS NEEDED
Status: DISCONTINUED | OUTPATIENT
Start: 2023-10-19 | End: 2023-10-19 | Stop reason: SURG

## 2023-10-19 RX ADMIN — PROPOFOL 80 MG: 10 INJECTION, EMULSION INTRAVENOUS at 13:36

## 2023-10-19 RX ADMIN — SODIUM CHLORIDE, POTASSIUM CHLORIDE, SODIUM LACTATE AND CALCIUM CHLORIDE: 600; 310; 30; 20 INJECTION, SOLUTION INTRAVENOUS at 13:59

## 2023-10-19 RX ADMIN — LIDOCAINE HYDROCHLORIDE 80 MG: 20 INJECTION, SOLUTION INFILTRATION; PERINEURAL at 13:36

## 2023-10-19 RX ADMIN — SODIUM CHLORIDE, POTASSIUM CHLORIDE, SODIUM LACTATE AND CALCIUM CHLORIDE: 600; 310; 30; 20 INJECTION, SOLUTION INTRAVENOUS at 13:30

## 2023-10-19 RX ADMIN — PROPOFOL 180 MCG/KG/MIN: 10 INJECTION, EMULSION INTRAVENOUS at 13:36

## 2023-10-19 NOTE — H&P
[facility]  Pre Procedure History & Physical    Chief Complaint:   Time for my colonoscopy    Subjective     HPI:   62 y.o. female presenting to endoscopy unit today for surveillance colonoscopy. And egd     Past Medical History:   Past Medical History:   Diagnosis Date    Allergic     Arthritis     KNEES    Back pain     DDD    Diverticulitis     Endometriosis     Family history of colon cancer     dad    GERD (gastroesophageal reflux disease)     Kidney stone     PRP (pityriasis rubra pilaris)     PTSD (post-traumatic stress disorder)     TMJ (dislocation of temporomandibular joint)        Family History:  Family History   Problem Relation Age of Onset    Heart failure Mother     Diabetes Mother     Dementia Mother     COPD Mother     Heart failure Father     Hypertension Father     Colon cancer Father     Malig Hyperthermia Neg Hx        Social History:   reports that she has never smoked. She has never used smokeless tobacco. She reports current alcohol use. She reports that she does not use drugs.    Medications:   Medications Prior to Admission   Medication Sig Dispense Refill Last Dose    baclofen (LIORESAL) 10 MG tablet Take 1 tablet by mouth 2 (Two) Times a Day As Needed for Muscle Spasms. 30 tablet 0     EPINEPHrine (EPIPEN) 0.3 MG/0.3ML solution auto-injector injection        EPINEPHrine (Primatene Mist) 0.125 MG/ACT aerosol Inhale 1 spray As Needed (exposure to allergen). 11.7 g 0     LORATADINE PO Take 10 mg by mouth.       mometasone (NASONEX) 50 MCG/ACT nasal spray 2 sprays into the nostril(s) as directed by provider Daily.       glucosamine-chondroitin 500-400 MG capsule capsule Take 1 capsule by mouth 2 (Two) Times a Day With Meals.       NON FORMULARY Cream for pain          Allergies:  Disinfectant products misc, Hand  [flavoring agent], Isopropyl alcohol, Phenergan [promethazine hcl], Latex, Morphine and related, and Penicillins    Objective     Blood pressure  "128/72, pulse 66, resp. rate 16, height 167.6 cm (66\"), weight 80.3 kg (177 lb), SpO2 95%, not currently breastfeeding.  Physical Exam:   General: patient awake, alert and cooperative    Assessment & Plan     Diagnosis:  Screen  Heartburn    Anticipated Surgical Procedure:  Colonoscopy and eg d    The risks, benefits, and alternatives of this procedure have been discussed with the patient or the responsible party- the patient understands and agrees to proceed.                                                                 "

## 2023-10-19 NOTE — ANESTHESIA PREPROCEDURE EVALUATION
Anesthesia Evaluation     Patient summary reviewed and Nursing notes reviewed                Airway   Mallampati: II  TM distance: >3 FB  Neck ROM: full  Dental      Pulmonary - negative pulmonary ROS   Cardiovascular - negative cardio ROS    Rhythm: regular  Rate: normal        Neuro/Psych  (+) psychiatric history Depression and Anxiety  GI/Hepatic/Renal/Endo    (+) GERD, renal disease- stones    Musculoskeletal     (+) back pain, neck pain  Abdominal    Substance History - negative use     OB/GYN negative ob/gyn ROS         Other                    Anesthesia Plan    ASA 2     MAC     intravenous induction     Anesthetic plan, risks, benefits, and alternatives have been provided, discussed and informed consent has been obtained with: patient.    Plan discussed with CRNA.    CODE STATUS:

## 2023-10-19 NOTE — ANESTHESIA POSTPROCEDURE EVALUATION
Patient: Paige Garnica    Procedure Summary       Date: 10/19/23 Room / Location: [facility] ENDOSCOPY 8 /  [facility]    Anesthesia Start: 1330 Anesthesia Stop: 1403    Procedures:       COLONOSCOPY to cecum and TI with cold polypectomies and clip placement x5      ESOPHAGOGASTRODUODENOSCOPY (Esophagus) Diagnosis:       Colon polyps      (Colon polyps [K63.5])    Surgeons: Matty Song MD Provider: Angelo Hawthorne MD    Anesthesia Type: MAC ASA Status: 2            Anesthesia Type: MAC    Vitals  Vitals Value Taken Time   /68 10/19/23 1402   Temp     Pulse 94 10/19/23 1409   Resp 20 10/19/23 1402   SpO2 97 % 10/19/23 1409   Vitals shown include unfiled device data.        Post Anesthesia Care and Evaluation    Patient location during evaluation: PACU  Patient participation: complete - patient participated  Level of consciousness: awake and alert  Pain management: adequate    Airway patency: patent  Anesthetic complications: No anesthetic complications    Cardiovascular status: acceptable  Respiratory status: acceptable  Hydration status: acceptable    Comments: --------------------            10/19/23               1402     --------------------   BP:       120/68     Pulse:      90       Resp:       20       SpO2:      95%      --------------------

## 2023-10-23 LAB
LAB AP CASE REPORT: NORMAL
PATH REPORT.FINAL DX SPEC: NORMAL
PATH REPORT.GROSS SPEC: NORMAL

## 2023-10-24 ENCOUNTER — TELEPHONE (OUTPATIENT)
Dept: GASTROENTEROLOGY | Facility: CLINIC | Age: 63
End: 2023-10-24
Payer: OTHER GOVERNMENT

## 2023-10-24 NOTE — TELEPHONE ENCOUNTER
----- Message from Matty Song MD sent at 10/24/2023  9:19 AM EDT -----  Tubular adenoma  Sessile serrated adenoma  Colonoscopy recall 3 years

## 2023-10-24 NOTE — TELEPHONE ENCOUNTER
Pt reviewed results via [employer].     Sent pt [employer] msg advising of results. Advised to call if any questions.     Colonoscopy placed in  and recall for 10/19/26.       hair removal not indicated hair removal not indicated

## 2024-01-29 ENCOUNTER — TELEPHONE (OUTPATIENT)
Dept: FAMILY MEDICINE CLINIC | Facility: CLINIC | Age: 64
End: 2024-01-29
Payer: OTHER GOVERNMENT

## 2024-01-30 DIAGNOSIS — M51.36 LUMBAR DEGENERATIVE DISC DISEASE: ICD-10-CM

## 2024-01-30 DIAGNOSIS — M54.41 CHRONIC LOW BACK PAIN WITH BILATERAL SCIATICA, UNSPECIFIED BACK PAIN LATERALITY: ICD-10-CM

## 2024-01-30 DIAGNOSIS — M62.838 MUSCLE SPASMS OF NECK: ICD-10-CM

## 2024-01-30 DIAGNOSIS — M54.41 CHRONIC MIDLINE LOW BACK PAIN WITH RIGHT-SIDED SCIATICA: ICD-10-CM

## 2024-01-30 DIAGNOSIS — G89.29 CHRONIC BILATERAL LOW BACK PAIN WITH BILATERAL SCIATICA: ICD-10-CM

## 2024-01-30 DIAGNOSIS — M54.42 CHRONIC BILATERAL LOW BACK PAIN WITH BILATERAL SCIATICA: ICD-10-CM

## 2024-01-30 DIAGNOSIS — M47.816 LUMBAR SPONDYLOSIS: Primary | ICD-10-CM

## 2024-01-30 DIAGNOSIS — M51.36 BULGING LUMBAR DISC: ICD-10-CM

## 2024-01-30 DIAGNOSIS — M54.41 CHRONIC BILATERAL LOW BACK PAIN WITH BILATERAL SCIATICA: ICD-10-CM

## 2024-01-30 DIAGNOSIS — G89.29 CHRONIC MIDLINE LOW BACK PAIN WITH RIGHT-SIDED SCIATICA: ICD-10-CM

## 2024-01-30 DIAGNOSIS — M54.42 CHRONIC LOW BACK PAIN WITH BILATERAL SCIATICA, UNSPECIFIED BACK PAIN LATERALITY: ICD-10-CM

## 2024-01-30 DIAGNOSIS — G89.29 CHRONIC LOW BACK PAIN WITH BILATERAL SCIATICA, UNSPECIFIED BACK PAIN LATERALITY: ICD-10-CM

## 2024-01-30 DIAGNOSIS — M25.569 KNEE PAIN, UNSPECIFIED CHRONICITY, UNSPECIFIED LATERALITY: ICD-10-CM

## 2024-03-25 ENCOUNTER — TELEPHONE (OUTPATIENT)
Dept: FAMILY MEDICINE CLINIC | Facility: CLINIC | Age: 64
End: 2024-03-25
Payer: OTHER GOVERNMENT

## 2024-03-25 NOTE — TELEPHONE ENCOUNTER
Patient called to inform that [facility] has not received her referral. She would also like that referral to go to different doctors for her knee pain and back pain. She will need a referral to Dr. Errol Zayas at [facility] to get PRP injections for her Knee pain. She would like a referral to Dr. Jaimes at [facility] for her back pain. She is waiting for referrals to schedule, patient will need to be seen soon. PRP shots will no longer be covered in May.

## 2024-03-25 NOTE — TELEPHONE ENCOUNTER
The referral is in [facility] for Dr. Jaimes she just needs the one for Dr. Zayas.  Please advise  [facility]

## 2024-03-25 NOTE — TELEPHONE ENCOUNTER
Patient is needing an updated referral sent to [facility] / she had been seen at [facility] previously & her referral will  soon.    Please call patient to let her know that this has been done

## 2024-03-26 DIAGNOSIS — M25.569 KNEE PAIN, UNSPECIFIED CHRONICITY, UNSPECIFIED LATERALITY: Primary | ICD-10-CM

## 2024-03-27 NOTE — TELEPHONE ENCOUNTER
Called the patient to let her know that an updated referral has been completed with  and it was faxed to [facility].

## 2024-05-06 ENCOUNTER — TELEPHONE (OUTPATIENT)
Dept: FAMILY MEDICINE CLINIC | Facility: CLINIC | Age: 64
End: 2024-05-06
Payer: OTHER GOVERNMENT

## 2024-08-21 ENCOUNTER — OFFICE VISIT (OUTPATIENT)
Dept: FAMILY MEDICINE CLINIC | Facility: CLINIC | Age: 64
End: 2024-08-21
Payer: OTHER GOVERNMENT

## 2024-08-21 VITALS
HEIGHT: 66 IN | DIASTOLIC BLOOD PRESSURE: 80 MMHG | BODY MASS INDEX: 31.6 KG/M2 | TEMPERATURE: 97.5 F | WEIGHT: 196.6 LBS | SYSTOLIC BLOOD PRESSURE: 124 MMHG | OXYGEN SATURATION: 98 % | HEART RATE: 86 BPM

## 2024-08-21 DIAGNOSIS — M51.36 BULGING LUMBAR DISC: ICD-10-CM

## 2024-08-21 DIAGNOSIS — M54.42 CHRONIC BILATERAL LOW BACK PAIN WITH BILATERAL SCIATICA: ICD-10-CM

## 2024-08-21 DIAGNOSIS — M62.838 MUSCLE SPASMS OF NECK: ICD-10-CM

## 2024-08-21 DIAGNOSIS — Z13.1 SCREENING FOR DIABETES MELLITUS: ICD-10-CM

## 2024-08-21 DIAGNOSIS — M47.816 LUMBAR SPONDYLOSIS: Primary | ICD-10-CM

## 2024-08-21 DIAGNOSIS — Z82.49 FAMILY HISTORY OF ARTERIOSCLEROTIC CARDIOVASCULAR DISEASE: ICD-10-CM

## 2024-08-21 DIAGNOSIS — G89.29 CHRONIC BILATERAL LOW BACK PAIN WITH BILATERAL SCIATICA: ICD-10-CM

## 2024-08-21 DIAGNOSIS — E55.9 VITAMIN D DEFICIENCY: ICD-10-CM

## 2024-08-21 DIAGNOSIS — M54.41 CHRONIC BILATERAL LOW BACK PAIN WITH BILATERAL SCIATICA: ICD-10-CM

## 2024-08-21 DIAGNOSIS — R22.2 LUMP IN CHEST: ICD-10-CM

## 2024-08-21 DIAGNOSIS — Z13.29 THYROID DISORDER SCREEN: ICD-10-CM

## 2024-08-21 DIAGNOSIS — T78.40XS MULTIPLE CHEMICAL SENSITIVITY SYNDROME, SEQUELA: ICD-10-CM

## 2024-08-21 DIAGNOSIS — E78.5 HYPERLIPIDEMIA, UNSPECIFIED HYPERLIPIDEMIA TYPE: ICD-10-CM

## 2024-08-21 DIAGNOSIS — E53.8 B12 DEFICIENCY: ICD-10-CM

## 2024-08-21 DIAGNOSIS — M51.36 LUMBAR DEGENERATIVE DISC DISEASE: ICD-10-CM

## 2024-08-21 NOTE — LETTER
August 21, 2024     Patient: Paige Garnica   YOB: 1960   Date of Visit: 8/21/2024     To Whom It May Concern:    It is my medical opinion that Paige Garnica may return to work today. However, she should restrict travel for work given her immense pain with standing and lifting that prevents her from being able to wait in lines at the airport, lift her luggage, or sit in planes for prolonged periods of time.          Sincerely,        Bettina Santos,     CC: No Recipients

## 2024-08-21 NOTE — PROGRESS NOTES
"Chief Complaint  Back Pain (Pt. Needs a letter for her work and referral for PT and surgeon to remove a cyst on chest)    Subjective        Paige Garnica presents to [facility]  History of Present Illness  Answers submitted by the patient for this visit:  Primary Reason for Visit (Submitted on 8/20/2024)  What is the primary reason for your visit?: Back Pain    Here for referral to PT. Note for work for back pain. Referral to plastics for chest lump.  Feels physical therapy at a location would be more helpful than physical therapy at home.  However she does have concerns regarding incentivize her use and other chemicals she would be exposed to at the physical therapy office.    Objective   Vital Signs:  /80   Pulse 86   Temp 97.5 °F (36.4 °C)   Ht 167.6 cm (66\")   Wt 89.2 kg (196 lb 9.6 oz)   SpO2 98%   BMI 31.73 kg/m²   Estimated body mass index is 31.73 kg/m² as calculated from the following:    Height as of this encounter: 167.6 cm (66\").    Weight as of this encounter: 89.2 kg (196 lb 9.6 oz).          Physical Exam  Vitals and nursing note reviewed.   Constitutional:       General: She is not in acute distress.     Appearance: Normal appearance. She is not ill-appearing.   HENT:      Head: Normocephalic and atraumatic.      Nose: Nose normal.      Mouth/Throat:      Mouth: Mucous membranes are moist.   Eyes:      Extraocular Movements: Extraocular movements intact.      Conjunctiva/sclera: Conjunctivae normal.   Cardiovascular:      Rate and Rhythm: Normal rate and regular rhythm.      Heart sounds: Normal heart sounds. No murmur heard.     No gallop.   Pulmonary:      Effort: Pulmonary effort is normal. No respiratory distress.      Breath sounds: Normal breath sounds. No stridor. No wheezing, rhonchi or rales.   Chest:      Chest wall: No tenderness.          Comments: Lipoma versus other enlargement on chest wall  Skin:     General: Skin is warm and dry. "   Neurological:      General: No focal deficit present.      Mental Status: She is alert and oriented to person, place, and time. Mental status is at baseline.   Psychiatric:         Mood and Affect: Mood normal.         Behavior: Behavior normal.        Result Review :                Assessment and Plan   Diagnoses and all orders for this visit:    1. Lumbar spondylosis (Primary)  -     Cancel: Ambulatory Referral to Physical Therapy for Evaluation & Treatment  -     Ambulatory Referral to Physical Therapy for Evaluation & Treatment    2. Muscle spasms of neck  -     Cancel: Ambulatory Referral to Physical Therapy for Evaluation & Treatment  -     Ambulatory Referral to Physical Therapy for Evaluation & Treatment    3. Lumbar degenerative disc disease  -     Cancel: Ambulatory Referral to Physical Therapy for Evaluation & Treatment  -     Ambulatory Referral to Physical Therapy for Evaluation & Treatment    4. Bulging lumbar disc  -     Cancel: Ambulatory Referral to Physical Therapy for Evaluation & Treatment  -     Ambulatory Referral to Physical Therapy for Evaluation & Treatment    5. Chronic bilateral low back pain with bilateral sciatica  -     Cancel: Ambulatory Referral to Physical Therapy for Evaluation & Treatment  -     Ambulatory Referral to Physical Therapy for Evaluation & Treatment    6. Lump in chest  -     Ambulatory Referral to Plastic Surgery    7. Multiple chemical sensitivity syndrome, sequela  -     Ambulatory Referral to Allergy    8. Hyperlipidemia, unspecified hyperlipidemia type  -     Lipid panel  -     CT Cardiac Calcium Score Without Dye    9. B12 deficiency  -     Vitamin B12    10. Vitamin D deficiency  -     Vitamin D 25 hydroxy    11. Thyroid disorder screen  -     TSH Rfx On Abnormal To Free T4    12. Screening for diabetes mellitus  -     Comprehensive metabolic panel  -     Hemoglobin A1c    13. Family history of arteriosclerotic cardiovascular disease  -     Lipid panel  -      CT Cardiac Calcium Score Without Dye      Here today for follow-up of back pain.  Would like to do physical therapy again, referral placed.  Work note completed.  Needs new referral for allergist for chemical sensitivity syndrome.  Referral placed.  Has elevated cholesterol levels that are mild but does not want to use a statin therapy if possible.  Will send for cardiac calcium score.  Due for other labs as above.  Referral to plastic surgery for lipoma on chest wall.       Follow Up   Return in about 6 months (around 2/21/2025) for Chronic care.  Patient was given instructions and counseling regarding her condition or for health maintenance advice. Please see specific information pulled into the AVS if appropriate.

## 2024-08-22 LAB
25(OH)D3+25(OH)D2 SERPL-MCNC: 27.8 NG/ML (ref 30–100)
ALBUMIN SERPL-MCNC: 4.5 G/DL (ref 3.5–5.2)
ALBUMIN/GLOB SERPL: 1.6 G/DL
ALP SERPL-CCNC: 156 U/L (ref 39–117)
ALT SERPL-CCNC: 34 U/L (ref 1–33)
AST SERPL-CCNC: 26 U/L (ref 1–32)
BILIRUB SERPL-MCNC: 0.4 MG/DL (ref 0–1.2)
BUN SERPL-MCNC: 11 MG/DL (ref 8–23)
BUN/CREAT SERPL: 13.6 (ref 7–25)
CALCIUM SERPL-MCNC: 9.9 MG/DL (ref 8.6–10.5)
CHLORIDE SERPL-SCNC: 102 MMOL/L (ref 98–107)
CHOLEST SERPL-MCNC: 230 MG/DL (ref 0–200)
CO2 SERPL-SCNC: 27.5 MMOL/L (ref 22–29)
CREAT SERPL-MCNC: 0.81 MG/DL (ref 0.57–1)
EGFRCR SERPLBLD CKD-EPI 2021: 81.7 ML/MIN/1.73
GLOBULIN SER CALC-MCNC: 2.9 GM/DL
GLUCOSE SERPL-MCNC: 81 MG/DL (ref 65–99)
HBA1C MFR BLD: 5.6 % (ref 4.8–5.6)
HDLC SERPL-MCNC: 71 MG/DL (ref 40–60)
LDLC SERPL CALC-MCNC: 137 MG/DL (ref 0–100)
POTASSIUM SERPL-SCNC: 4.1 MMOL/L (ref 3.5–5.2)
PROT SERPL-MCNC: 7.4 G/DL (ref 6–8.5)
SODIUM SERPL-SCNC: 140 MMOL/L (ref 136–145)
TRIGL SERPL-MCNC: 128 MG/DL (ref 0–150)
TSH SERPL DL<=0.005 MIU/L-ACNC: 1.41 UIU/ML (ref 0.27–4.2)
VIT B12 SERPL-MCNC: 361 PG/ML (ref 211–946)
VLDLC SERPL CALC-MCNC: 22 MG/DL (ref 5–40)

## 2024-09-12 ENCOUNTER — HOSPITAL ENCOUNTER (OUTPATIENT)
Dept: CT IMAGING | Facility: HOSPITAL | Age: 64
Discharge: HOME | End: 2024-09-12
Admitting: STUDENT IN AN ORGANIZED HEALTH CARE EDUCATION/TRAINING PROGRAM

## 2024-09-12 PROCEDURE — 75571 CT HRT W/O DYE W/CA TEST: CPT

## 2025-01-08 ENCOUNTER — TELEPHONE (OUTPATIENT)
Dept: FAMILY MEDICINE CLINIC | Facility: CLINIC | Age: 65
End: 2025-01-08
Payer: OTHER GOVERNMENT

## 2025-01-08 NOTE — TELEPHONE ENCOUNTER
The following patient is requesting referral to Dr. Renetta Swan at [facility].The patient is requesting the referral because she chalazion.     Please call patient with status of the request.

## 2025-01-09 DIAGNOSIS — H00.19 CHALAZION, UNSPECIFIED LATERALITY: Primary | ICD-10-CM

## 2025-03-27 ENCOUNTER — TELEPHONE (OUTPATIENT)
Dept: FAMILY MEDICINE CLINIC | Facility: CLINIC | Age: 65
End: 2025-03-27

## 2025-03-27 NOTE — TELEPHONE ENCOUNTER
PATIENT CALLED AND STATES SHE HAS ABDOMINAL PAIN. NO BLOOD IN STOOL. WATERY STOOL DUE TO DULCOLAX., NAUSEATED    SHE HAS DIVERTICULITIS.  SHE HAS HAD 2 BOWEL OBSTRUCTIONS    ADVISED TO GO TO ER   SHE WILL GO TO [facility] IN [place]    CALL BACK NUMBER [phone]

## 2025-04-07 ENCOUNTER — OFFICE VISIT (OUTPATIENT)
Dept: FAMILY MEDICINE CLINIC | Facility: CLINIC | Age: 65
End: 2025-04-07
Payer: OTHER GOVERNMENT

## 2025-04-07 VITALS
HEART RATE: 106 BPM | DIASTOLIC BLOOD PRESSURE: 80 MMHG | OXYGEN SATURATION: 97 % | SYSTOLIC BLOOD PRESSURE: 126 MMHG | BODY MASS INDEX: 30.47 KG/M2 | TEMPERATURE: 97.5 F | HEIGHT: 66 IN | WEIGHT: 189.6 LBS

## 2025-04-07 DIAGNOSIS — K55.069 MESENTERIC THROMBOSIS: ICD-10-CM

## 2025-04-07 DIAGNOSIS — Z09 HOSPITAL DISCHARGE FOLLOW-UP: Primary | ICD-10-CM

## 2025-04-07 DIAGNOSIS — I70.90 ARTERIAL OCCLUSION: ICD-10-CM

## 2025-04-07 DIAGNOSIS — R10.9 ABDOMINAL PAIN, UNSPECIFIED ABDOMINAL LOCATION: ICD-10-CM

## 2025-04-07 DIAGNOSIS — M54.41 CHRONIC MIDLINE LOW BACK PAIN WITH RIGHT-SIDED SCIATICA: ICD-10-CM

## 2025-04-07 DIAGNOSIS — M25.569 KNEE PAIN, UNSPECIFIED CHRONICITY, UNSPECIFIED LATERALITY: ICD-10-CM

## 2025-04-07 DIAGNOSIS — G89.29 CHRONIC MIDLINE LOW BACK PAIN WITH RIGHT-SIDED SCIATICA: ICD-10-CM

## 2025-04-07 PROBLEM — K57.92 DIVERTICULITIS: Status: ACTIVE | Noted: 2025-04-07

## 2025-04-07 PROBLEM — M47.812 CERVICAL SPONDYLOSIS WITHOUT MYELOPATHY: Status: ACTIVE | Noted: 2025-04-07

## 2025-04-07 PROBLEM — N20.0 KIDNEY STONE: Status: ACTIVE | Noted: 2025-04-07

## 2025-04-07 PROBLEM — N80.9 ENDOMETRIOSIS: Status: ACTIVE | Noted: 2025-04-07

## 2025-04-07 RX ORDER — APIXABAN 5 MG/1
1 TABLET, FILM COATED ORAL EVERY 12 HOURS SCHEDULED
COMMUNITY
Start: 2025-03-30

## 2025-04-07 NOTE — PATIENT INSTRUCTIONS
Patient encouraged to go to ER if she experiences increase chest pain, increase shortness of breath, increased abdominal pain.  Pepcid samples given to patient today.   Encouraged to take Pepcid or Prilosec daily for 1 to 2 weeks to see if symptoms improve, may wean herself off.

## 2025-04-07 NOTE — LETTER
April 7, 2025     Patient: Paige Garnica   YOB: 1960   Date of Visit: 4/7/2025       To Whom It May Concern:    It is my medical opinion that Paige Garnica may return to work until cleared by specialists, return date is still to be determined.        Sincerely,        Alessia Quiroz APRN    CC: No Recipients

## 2025-04-07 NOTE — LETTER
April 7, 2025     Patient: Paige Garnica   YOB: 1960   Date of Visit: 4/7/2025       To Whom It May Concern:    It is my medical opinion that Paige Garnica may not return to work until cleared by specialists, return date is still to be determined.        Sincerely,        Alessia Quiroz APRN    CC: No Recipients

## 2025-04-07 NOTE — PROGRESS NOTES
Transitional Care Follow Up Visit  Subjective     Paige Garnica is a 64 y.o. female who presents for a transitional care management visit.    Within 48 business hours after discharge our office contacted her via telephone to coordinate her care and needs.      I reviewed and discussed the details of that call along with the discharge summary, hospital problems, inpatient lab results, inpatient diagnostic studies, and consultation reports with Paige.     Current outpatient and discharge medications have been reconciled for the patient.  Reviewed by: TARYN Leung    History of Present Illness   Course During Hospital Stay:   [facility]/27/25-03/30/25  64-year-old female with past medical history of diverticulitis and small bowel obstruction with multiple surgeries including bowel resection and lysis of adhesions who presents with left-sided abdominal pain for the past week. States that the pain has been constant and waxes and wanes throughout the day. States the pain is mainly in her left side but radiates throughout her abdomen. Has had similar pain with both diverticulitis as well as obstructions in the past. States that she is passing gas and took stool softeners with stool yesterday. Reports nausea but denies vomiting. Has had decreased p.o. intake. Denies any known fever. Denies dysuria or hematuria.   CTA with subocclusive thrombus of the anterior cecal artery. No evidence of bowel ischemia. Will start heparin.  Remained stable through ED course. Pain controlled. Plan for admission for further management. Discussed with Dr. Gilmore on-call for general surgery here, who recommended transfer to [facility] for vascular surgery evaluation and involvement.  Discussed with Dr. Helton on-call for vascular surgery, who agreed with transfer to [facility].   no plans for surgical intervention at this time, will plan on tx w/ anticoagulation for 3-6 months   Plan:   1. Telemetry, am labs, BNP,  "troponin, CRP, ESR, FLP, ECHO (TTE for now), heparin infusion, Vascular surgery consultation, and supportive care.  2. Mammogram recommended in the OP setting.  3. Liver US and acute hepatic panel.           CTA abdomen  IMPRESSION:1. Suspect subocclusive thrombus in the anterior cecal artery. No definite evidence of ischemic bowel at this time.2. Concern for nonspecific hepatitis.3. Diverticulosis.4. Asymmetric parenchymal density of the left breast visually stable. Clinical correlation and correlation with recent mammography recommended         F/u; son is present with patient today.  Vascular 25; dr chand; patient reports she will not go back to him.  Reports she was very unhappy with his bedside manner, requesting new referral for vascular surgery.      Patient reports she has been experiencing pain in the center of the chest, started in hospital.  Describes it as some heaviness in the chest.  States is not as bad as it was, slowly improving.  Hard to get a good deep breath.   SOB more with movement.   Burping some brings relief sometimes.  History of past acid reflux.  Patient is currently on Eliquis.  Reports since she has been home from the hospital, she has experienced muscle aches, most common in the legs and her coccyx.  Last night took 2 Motrin, relieve some pain.  Abdominal pain has improved.  Constipation; coming and going; taking meds as needed   Fiber wise at home, prefers all natural products.  Acid reflux; reports she has Prilosec 40 mg at home, but has not taken it, discussed adding probiotic.    Reports her Sister  recently in [place], during that time she thought she was having heart issues/palpitations; feels like maybe \"a fib\" but has never been diagnosed with a fib, on telemetry during entire stay at hospital, no concerns.  Reports when she got home from [facility] that was when she threw a clot, wondering if all of that is related.  Would like to be evaluated by cardiology and " hematology.    Needs a new pain management referral for [facility].  Currently going to [facility], Dr. gore and [place] for knee pain and chronic back pain.    Past Medical History:   Diagnosis Date    Allergic     Arthritis     KNEES    Back pain     DDD    Diverticulitis     Endometriosis     Family history of colon cancer     dad    GERD (gastroesophageal reflux disease)     Kidney stone     PRP (pityriasis rubra pilaris)     PTSD (post-traumatic stress disorder)     TMJ (dislocation of temporomandibular joint)      Past Surgical History:   Procedure Laterality Date    APPENDECTOMY       SECTION      x1    COLON SURGERY      twice, one was RESECTION, other malroation when was a baby    COLONOSCOPY N/A 10/19/2023    Procedure: COLONOSCOPY to cecum and TI with cold polypectomies and clip placement x5;  Surgeon: Matty Song MD;  Location:  [facility];  Service: Gastroenterology;  Laterality: N/A;  PRE - screening  POST - diverticulosis, polyps, hemorrhoids    CYSTOSCOPY W/ LASER LITHOTRIPSY      ENDOSCOPY N/A 10/19/2023    Procedure: ESOPHAGOGASTRODUODENOSCOPY;  Surgeon: Matty Song MD;  Location:  [facility];  Service: Gastroenterology;  Laterality: N/A;  PRE - gerd  POST - hiatal hernia    LAPAROSCOPIC LYSIS OF ADHESIONS      ABLATIONS MULTIPLE TIMES    LAPAROTOMY OOPHERECTOMY Right     part of left now     Family History   Problem Relation Age of Onset    Heart failure Mother     Diabetes Mother     Dementia Mother     COPD Mother     Heart failure Father     Hypertension Father     Colon cancer Father     Malig Hyperthermia Neg Hx          The following portions of the patient's history were reviewed and updated as appropriate: She does not have any pertinent problems on file.  Current Outpatient Medications   Medication Sig Dispense Refill    Eliquis 5 MG tablet tablet Take 1 tablet by mouth Every 12 (Twelve) Hours.      LORATADINE PO Take 10 mg by mouth.      NON FORMULARY Cream  "for pain       No current facility-administered medications for this visit.     She is allergic to disinfectant products misc, hand  [flavoring agent], isopropyl alcohol, phenergan [promethazine hcl], latex, morphine and codeine, and penicillins..        Objective   /80   Pulse 106   Temp 97.5 °F (36.4 °C) (Temporal)   Ht 167.6 cm (65.98\")   Wt 86 kg (189 lb 9.6 oz)   SpO2 97%   BMI 30.62 kg/m²   Physical Exam  Vitals reviewed.   Constitutional:       General: She is not in acute distress.     Appearance: Normal appearance.   HENT:      Head: Normocephalic and atraumatic.      Nose: Nose normal. No congestion.      Mouth/Throat:      Mouth: Mucous membranes are moist.      Pharynx: No oropharyngeal exudate or posterior oropharyngeal erythema.   Eyes:      Conjunctiva/sclera: Conjunctivae normal.      Pupils: Pupils are equal, round, and reactive to light.   Cardiovascular:      Rate and Rhythm: Normal rate and regular rhythm.      Pulses: Normal pulses.      Heart sounds: No murmur heard.     No gallop.   Pulmonary:      Effort: Pulmonary effort is normal. No respiratory distress.      Breath sounds: Normal breath sounds. No wheezing.   Abdominal:      General: Bowel sounds are normal. There is no distension.      Palpations: Abdomen is soft.      Tenderness: There is no abdominal tenderness.   Musculoskeletal:         General: Normal range of motion.      Cervical back: Normal range of motion and neck supple. No tenderness.      Right lower leg: No edema.      Left lower leg: No edema.   Skin:     General: Skin is warm and dry.   Neurological:      Mental Status: She is alert and oriented to person, place, and time. Mental status is at baseline.   Psychiatric:         Mood and Affect: Mood is anxious.         Assessment & Plan   Problems Addressed this Visit       Hospital discharge follow-up - Primary    Relevant Orders    Ambulatory Referral to Vascular Surgery    Ambulatory Referral to " Hematology    Abdominal pain    Arterial occlusion    Relevant Orders    Ambulatory Referral to Vascular Surgery    Ambulatory Referral to Cardiology    Ambulatory Referral to Hematology    Mesenteric thrombosis    Relevant Orders    Ambulatory Referral to Vascular Surgery    Ambulatory Referral to Hematology     Other Visit Diagnoses         Chronic midline low back pain with right-sided sciatica        Relevant Orders    Ambulatory Referral to Pain Management      Knee pain, unspecified chronicity, unspecified laterality        Relevant Orders    Ambulatory Referral to Pain Management          Diagnoses         Codes Comments      Hospital discharge follow-up    -  Primary ICD-10-CM: Z09  ICD-9-CM: V67.59       Abdominal pain, unspecified abdominal location     ICD-10-CM: R10.9  ICD-9-CM: 789.00       Arterial occlusion     ICD-10-CM: I70.90  ICD-9-CM: 444.9       Mesenteric thrombosis     ICD-10-CM: K55.069  ICD-9-CM: 557.0       Chronic midline low back pain with right-sided sciatica     ICD-10-CM: M54.41, G89.29  ICD-9-CM: 724.2, 724.3, 338.29       Knee pain, unspecified chronicity, unspecified laterality     ICD-10-CM: M25.569  ICD-9-CM: 719.46             Will obtain records from [facility]. In notes States a  BNP, troponin, CRP, ESR, FLP, ECHO (TTE for now), Liver US and acute hepatic panel were ordered but unable to find results in chart.  Patient encouraged to go to ER if she experiences increase chest pain, increase shortness of breath, increased abdominal pain. Patient voices understanding.    Pepcid samples given to patient today.  Believes acid reflux is acting up, encouraged to take Pepcid or Prilosec daily for 1 to 2 weeks to see if symptoms improve, may wean herself off. Use a daily probiotic.    Patient reports she does not want to see Dr. Helton for vascular follow-up, reports she did not like his Bedside manner.    She would like new referral for vascular.  Referral placed for [employer] vascular  surgeon.  Patient encouraged to follow-up at office if unable to get in [facility] vascular surgeon sooner.  Stated they recommended a repeat CT abd in 4 weeks to check clot, unable to find that on discharge summary.    Patient would like cardiology referral for cardiac workup due to shortness of breath, new dx of blood clot.    Patient would like referral to hematology for new onset of subocclusive thrombus in the anterior cecal artery from unknown cause.    Patient encouraged to follow-up in 2 to 4 weeks for annual physical, will address left breast finding at that time, discuss needing an updated mammogram.         I spent 75 minutes caring for Paige on this date of service. This time includes time spent by me in the following activities: preparing for the visit, reviewing tests, performing a medically appropriate examination and/or evaluation, counseling and educating the patient/family/caregiver, referring and communicating with other health care professionals, documenting information in the medical record, independently interpreting results and communicating that information with the patient/family/caregiver, care coordination, obtaining a separately obtained history, and reviewing a separately obtained history

## 2025-04-14 ENCOUNTER — TELEPHONE (OUTPATIENT)
Dept: ONCOLOGY | Facility: CLINIC | Age: 65
End: 2025-04-14

## 2025-04-14 NOTE — TELEPHONE ENCOUNTER
Caller: Paige Garnica    Relationship: Self    Best call back number: [phone]       Who are you requesting to speak with (clinical staff, provider,  specific staff member): CLINICAL      What was the call regarding: PATIENT STATES SHE HAS MULTIPLE CHEMICAL SENSITIVITIES AND CANNOT BE EXPOSED TO COLOGNE, STRONG DEODERANT'S, AND OTHER SCENTS. ONE PRIMARY CONCERN IS ISOPROPYL ALCOHOL AND HAND . REQUESTS AN ALTERNATE SUBSTANCE TO CLEAN ARM FOR LABS AT NEW PATIENT APPT.

## 2025-04-25 ENCOUNTER — PRIOR AUTHORIZATION (OUTPATIENT)
Dept: ONCOLOGY | Facility: CLINIC | Age: 65
End: 2025-04-25
Payer: OTHER GOVERNMENT

## 2025-04-29 ENCOUNTER — CONSULT (OUTPATIENT)
Dept: ONCOLOGY | Facility: CLINIC | Age: 65
End: 2025-04-29
Payer: OTHER GOVERNMENT

## 2025-04-29 VITALS
OXYGEN SATURATION: 97 % | HEART RATE: 89 BPM | TEMPERATURE: 98 F | BODY MASS INDEX: 30.7 KG/M2 | WEIGHT: 191 LBS | DIASTOLIC BLOOD PRESSURE: 88 MMHG | SYSTOLIC BLOOD PRESSURE: 157 MMHG | HEIGHT: 66 IN

## 2025-04-29 DIAGNOSIS — K55.069 SUPERIOR MESENTERIC ARTERY THROMBOSIS: Primary | ICD-10-CM

## 2025-04-29 RX ORDER — APIXABAN 5 MG/1
5 TABLET, FILM COATED ORAL EVERY 12 HOURS SCHEDULED
Qty: 60 TABLET | Refills: 4 | Status: SHIPPED | OUTPATIENT
Start: 2025-04-29 | End: 2025-09-26

## 2025-04-29 NOTE — PROGRESS NOTES
Hematology Initial Note    Encounter Date: 2025    Referred by: Richie Alessia Savage, Aprn  [id number] [facility]  [address]  [place] [id number]       Hematology History  2025: Ms. Paige Garnica is a 64 y.o. female who is here for evaluation of SMA thrombosis.  She was hospitalized in 2025 with acute episode of abdominal pain, nausea.  She reports antecedent episode of dizziness, hot flash and severe fatigue few days prior to this.  CT angiogram done in ER showed diffuse inflammation surrounding the previous celiac artery with a potential partial occlusion of the anterior cecal artery.  Remainder of the distal vessel is partially opacified and some collaterals identified distally.  There was no evidence of bowel ischemia.  She was started on heparin gtt. and later transitioned to Eliquis.  No surgical intervention required and was discharged with no recurrence of symptoms.  She has multiple chemical sensitivity syndrome and had allergy and anaphylactic reaction to various allergens.  Denied any skin rash, urticaria, dermatographism.  No previous history of thromboembolism, stroke, MI.  No family history of VTE.    Past Medical History  she  has a past medical history of Allergic, Arthritis, Back pain, Diverticulitis, Endometriosis, Family history of colon cancer, GERD (gastroesophageal reflux disease), Kidney stone, PRP (pityriasis rubra pilaris), PTSD (post-traumatic stress disorder), and TMJ (dislocation of temporomandibular joint).    Past Surgical History  she  has a past surgical history that includes Appendectomy; Colon surgery;  section; Laparotomy oopherectomy (Right); Cystoscopy w/ laser lithotripsy; laparoscopic lysis of adhesions; Colonoscopy (N/A, 10/19/2023); and Esophagogastroduodenoscopy (N/A, 10/19/2023).    Family History  family history includes COPD in her mother; Colon cancer in her father; Dementia in her mother; Diabetes in her mother; Heart failure in her father and  "mother; Hypertension in her father.     Social History   reports that she has never smoked. She has never been exposed to tobacco smoke. She has never used smokeless tobacco. She reports current alcohol use. She reports that she does not use drugs.    Review of Systems: 10 point review of systems negative except for ones mentioned in HPI.    Objective   /88   Pulse 89   Temp 98 °F (36.7 °C) (Oral)   Ht 167.6 cm (65.98\")   Wt 86.6 kg (191 lb)   SpO2 97%   BMI 30.84 kg/m²   Body surface area is 1.96 meters squared.  Body mass index is 30.84 kg/m².  Physical Exam  Vitals reviewed.   Constitutional:       Appearance: Normal appearance.   HENT:      Head: Normocephalic.      Nose: Nose normal.      Mouth/Throat:      Mouth: Mucous membranes are moist.   Eyes:      General: No scleral icterus.     Conjunctiva/sclera: Conjunctivae normal.   Cardiovascular:      Rate and Rhythm: Normal rate.      Pulses: Normal pulses.   Pulmonary:      Effort: Pulmonary effort is normal.      Breath sounds: Normal breath sounds.   Musculoskeletal:      Cervical back: Normal range of motion.   Skin:     General: Skin is warm.   Neurological:      Mental Status: She is alert. Mental status is at baseline.   Psychiatric:         Mood and Affect: Mood normal.         Behavior: Behavior normal.           Imaging/Labs    CMP          8/21/2024    14:24   CMP   Glucose 81    BUN 11    Creatinine 0.81    EGFR 81.7    Sodium 140    Potassium 4.1    Chloride 102    Calcium 9.9    Total Protein 7.4    Albumin 4.5    Globulin 2.9    Total Bilirubin 0.4    Alkaline Phosphatase 156    AST (SGOT) 26    ALT (SGPT) 34    Albumin/Globulin Ratio 1.6    BUN/Creatinine Ratio 13.6      CrCl cannot be calculated (Patient's most recent lab result is older than the maximum 30 days allowed.).    Assessment & Plan   Superior mesenteric artery thrombosis     - Suspected thrombus in anterior cecal artery, presented with abdominal pain, nausea.  No evidence " of bowel ischemia.  Currently on Eliquis 5 mg twice daily.  No personal or family history of VTE.    PLAN  -No indication for hypercoagulable workup.  - She has follow-up with cardiology for evaluation for arrhythmias.  Suspicious of an arrhythmic episode prior to the onset of abdominal pain.  Symptoms however did not recur.  She is also using epinephrine inhaler frequently for allergic reactions.  - Refilled Eliquis, recommend continuing for total of 6 months.  Will get follow-up in 5 months around the time of discontinuation.    I reviewed recent lab, imaging, and pathology results as available this visit and interpreted independently and discussed with the patient. 45 minutes spent with patient with more than 50% of time on face-to-face counseling.        Aasems Jacob, MD  Hematology/Oncology

## 2025-04-30 ENCOUNTER — OFFICE VISIT (OUTPATIENT)
Age: 65
End: 2025-04-30
Payer: OTHER GOVERNMENT

## 2025-04-30 VITALS
SYSTOLIC BLOOD PRESSURE: 146 MMHG | WEIGHT: 191 LBS | DIASTOLIC BLOOD PRESSURE: 81 MMHG | RESPIRATION RATE: 16 BRPM | HEART RATE: 88 BPM | HEIGHT: 66 IN | BODY MASS INDEX: 30.7 KG/M2

## 2025-04-30 DIAGNOSIS — I70.90 ARTERIAL OCCLUSION: ICD-10-CM

## 2025-04-30 DIAGNOSIS — K55.069 MESENTERIC THROMBOSIS: Primary | ICD-10-CM

## 2025-04-30 NOTE — PROGRESS NOTES
Chief Complaint  Mesenteric Artery Stenosis (thrombosis)    Subjective        Paige Garnica presents to [facility] VASCULAR SURGERY  History of Present Illness  64 y.o. female with OA, severe allergy to perfumes/scents, and PTSD who was referred to me as a new patient for follow up of thrombosis of the anterior cecal artery.  This was diagnosed by CTA at [facility] on 3/27/25.  Prior to her hospitalization, she began having abdominal pain while she was traveling by car for a .  She spent 7.5 hours in the car for the trip.  She denies any leg pain or leg swelling at this time.  She denies any personal or family history of clotting or clotting disorders.    She was seen at [facility] in 2025.  She was treated with heparin with resolution of her pain then transitioned to eliquis with no recurrent abdominal pain.  She denies weight loss.  She has never been a cigarette smoker, she has never taken estrogen or progesterone.  She has seen hematology, who did not recommend any further evaluation for thrombophilia.  She had follow up scheduled with cardiology for work up for a cardiac source of embolus or arrhythmia.      Past Medical History:   Diagnosis Date    Allergic     Arthritis     KNEES    Back pain     DDD    Diverticulitis     Endometriosis     Family history of colon cancer     dad    GERD (gastroesophageal reflux disease)     Kidney stone     PRP (pityriasis rubra pilaris)     PTSD (post-traumatic stress disorder)     TMJ (dislocation of temporomandibular joint)        Past Surgical History:   Procedure Laterality Date    APPENDECTOMY       SECTION      x1    COLON SURGERY      twice, one was RESECTION, other malroation when was a baby    COLONOSCOPY N/A 10/19/2023    Procedure: COLONOSCOPY to cecum and TI with cold polypectomies and clip placement x5;  Surgeon: Matty Song MD;  Location: [facility] ENDOSCOPY;  Service: Gastroenterology;  Laterality:  "N/A;  PRE - screening  POST - diverticulosis, polyps, hemorrhoids    CYSTOSCOPY W/ LASER LITHOTRIPSY      ENDOSCOPY N/A 10/19/2023    Procedure: ESOPHAGOGASTRODUODENOSCOPY;  Surgeon: Matty Song MD;  Location: [facility] ENDOSCOPY;  Service: Gastroenterology;  Laterality: N/A;  PRE - gerd  POST - hiatal hernia    LAPAROSCOPIC LYSIS OF ADHESIONS      ABLATIONS MULTIPLE TIMES    LAPAROTOMY OOPHERECTOMY Right     part of left now       Family History   Problem Relation Age of Onset    Heart failure Mother     Diabetes Mother     Dementia Mother     COPD Mother     Heart failure Father     Hypertension Father     Colon cancer Father     Malig Hyperthermia Neg Hx          Social History     Tobacco Use    Smoking status: Never     Passive exposure: Never    Smokeless tobacco: Never   Vaping Use    Vaping status: Never Used   Substance Use Topics    Alcohol use: Yes     Comment: 1-2 a month    Drug use: Never       Allergies: Disinfectant products misc, Hand  [flavoring agent], Isopropyl alcohol, Phenergan [promethazine hcl], Latex, Morphine and codeine, and Penicillins    Prior to Admission medications    Medication Sig Start Date End Date Taking? Authorizing Provider   Eliquis 5 MG tablet tablet Take 1 tablet by mouth Every 12 (Twelve) Hours for 150 days. 4/29/25 9/26/25 Yes Jacob, Aasems, MD   LORATADINE PO Take 10 mg by mouth.   Yes ProviderAdam MD   NON FORMULARY Cream for pain   Yes Provider, MD Adam         Objective   Vital Signs:  /81 (BP Location: Left arm, Patient Position: Sitting, Cuff Size: Adult)   Pulse 88   Resp 16   Ht 167 cm (65.75\")   Wt 86.6 kg (191 lb)   BMI 31.06 kg/m²   Estimated body mass index is 31.06 kg/m² as calculated from the following:    Height as of this encounter: 167 cm (65.75\").    Weight as of this encounter: 86.6 kg (191 lb).               Physical Exam  Vitals reviewed.   Constitutional:       General: She is not in acute distress.     " Appearance: Normal appearance.   HENT:      Head: Normocephalic and atraumatic.      Right Ear: External ear normal.      Left Ear: External ear normal.      Nose: Nose normal.      Mouth/Throat:      Mouth: Mucous membranes are moist.      Pharynx: Oropharynx is clear.   Eyes:      Extraocular Movements: Extraocular movements intact.      Conjunctiva/sclera: Conjunctivae normal.      Pupils: Pupils are equal, round, and reactive to light.   Cardiovascular:      Rate and Rhythm: Normal rate and regular rhythm.      Pulses:           Carotid pulses are 2+ on the right side and 2+ on the left side.       Radial pulses are 2+ on the right side and 2+ on the left side.        Dorsalis pedis pulses are 2+ on the right side and 2+ on the left side.        Posterior tibial pulses are 2+ on the right side and 2+ on the left side.   Pulmonary:      Comments: Non labored respirations on room air, equal chest rise  Abdominal:      General: Abdomen is flat. There is no distension.      Palpations: Abdomen is soft.   Musculoskeletal:      Cervical back: Normal range of motion. No rigidity.      Right lower leg: No edema.      Left lower leg: No edema.   Skin:     General: Skin is warm and dry.      Capillary Refill: Capillary refill takes less than 2 seconds.   Neurological:      General: No focal deficit present.      Mental Status: She is alert and oriented to person, place, and time.   Psychiatric:         Mood and Affect: Mood normal.         Behavior: Behavior normal.        Result Review :    The following data was reviewed by: Estefany Garay MD on 04/30/2025:  Common labs          8/21/2024    14:24   Common Labs   Glucose 81    BUN 11    Creatinine 0.81    Sodium 140    Potassium 4.1    Chloride 102    Calcium 9.9    Albumin 4.5    Total Bilirubin 0.4    Alkaline Phosphatase 156    AST (SGOT) 26    ALT (SGPT) 34    Total Cholesterol 230    Triglycerides 128    HDL Cholesterol 71    LDL Cholesterol  137    Hemoglobin  A1C 5.60      CMP          2024    14:24   CMP   Glucose 81    BUN 11    Creatinine 0.81    EGFR 81.7    Sodium 140    Potassium 4.1    Chloride 102    Calcium 9.9    Total Protein 7.4    Albumin 4.5    Globulin 2.9    Total Bilirubin 0.4    Alkaline Phosphatase 156    AST (SGOT) 26    ALT (SGPT) 34    Albumin/Globulin Ratio 1.6    BUN/Creatinine Ratio 13.6        BMP          2024    14:24   BMP   BUN 11    Creatinine 0.81    Sodium 140    Potassium 4.1    Chloride 102    CO2 27.5    Calcium 9.9          Last Echo       Last Stress       Last Cath  No results found for this or any previous visit.                Assessment and Plan     Diagnoses and all orders for this visit:    1. Mesenteric thrombosis (Primary)  -     CT Angiogram Abdomen Pelvis; Future    2. Arterial occlusion  -     CT Angiogram Abdomen Pelvis; Future  -     Duplex Venous Lower Extremity - Bilateral CAR; Future    Paige Garnica is a 64 year old female who was referred to me for an acute thrombus of the anterior cecal artery.  This occurred spontaneously following travelling for a , 7.5 hours each way.  No prior history of DVT or thrombophilia.  Her symptoms have resolved with anticoagulation.  She has been on eliquis since late 2025.  Hematology recommended no further work up for thrombophilia and cardiac work up is pending.  I did personally and independently review her CT angiogram of the abdomen and pelvis from 3/27/25 from [facility].  There is a lot of stranding and inflammation in the mesentery near the ileocecal branch of the SMA.  I cannot tell if this represents clot vs mesenteric inflammation.  There is no significant atherosclerotic disease of the imaged aorta.  There is no stenosis of the SMA or celiac arteries.  MIKE is patent.  Bilateral hypogastric arteries are patent.  I discussed these findings with the patient.  It is certainly difficult to determine if this is strictly inflammatory or if there is  clot present in this area.  I have recommended we obtain a follow up CTA of the abdomen and pelvis for surveillance.  Hopefully the inflammation has resolved or improved and I can get a better view of the vessels.  If it is clot, was this thromboembolic or thrombotic?  I would favor thromboembolic, which would be much more common.  I agree with cardiac work up.  I will also check venous duplexes of bilateral lower extremities for any concurrent DVT given her history of recent travel.  We will continue her eliquis for medical management.        I spent >60 minutes caring for Paige on this date of service. This time includes time spent by me in the following activities:preparing for the visit, reviewing tests, obtaining and/or reviewing a separately obtained history, performing a medically appropriate examination and/or evaluation , counseling and educating the patient/family/caregiver, ordering medications, tests, or procedures, and documenting information in the medical record  Follow Up     No follow-ups on file.  Patient was given instructions and counseling regarding her condition or for health maintenance advice. Please see specific information pulled into the AVS if appropriate.   Any information in this note that has been copied and pasted was reviewed and edited to reflect today's exam.  This note was created using Dragon dictation software.  I have reviewed the note for accuracy and made corrections as needed.  Please note that some errors may still exist, please contact me with any questions or concerns.

## 2025-05-06 ENCOUNTER — HOSPITAL ENCOUNTER (OUTPATIENT)
Dept: CARDIOLOGY | Facility: HOSPITAL | Age: 65
Discharge: HOME | End: 2025-05-06
Admitting: STUDENT IN AN ORGANIZED HEALTH CARE EDUCATION/TRAINING PROGRAM
Payer: OTHER GOVERNMENT

## 2025-05-06 DIAGNOSIS — I70.90 ARTERIAL OCCLUSION: ICD-10-CM

## 2025-05-06 LAB
BH CV LOW VAS LEFT EXTERNAL ILIAC AUGMENT: NORMAL
BH CV LOW VAS LEFT EXTERNAL ILIAC COMPRESS: NORMAL
BH CV LOWER VASCULAR LEFT COMMON FEMORAL AUGMENT: NORMAL
BH CV LOWER VASCULAR LEFT COMMON FEMORAL COMPETENT: NORMAL
BH CV LOWER VASCULAR LEFT COMMON FEMORAL COMPRESS: NORMAL
BH CV LOWER VASCULAR LEFT COMMON FEMORAL PHASIC: NORMAL
BH CV LOWER VASCULAR LEFT COMMON FEMORAL SPONT: NORMAL
BH CV LOWER VASCULAR LEFT DISTAL FEMORAL COMPRESS: NORMAL
BH CV LOWER VASCULAR LEFT EXTERNAL ILIAC COMPETENT: NORMAL
BH CV LOWER VASCULAR LEFT EXTERNAL ILIAC PHASIC: NORMAL
BH CV LOWER VASCULAR LEFT EXTERNAL ILIAC SPONT: NORMAL
BH CV LOWER VASCULAR LEFT GASTRONEMIUS COMPRESS: NORMAL
BH CV LOWER VASCULAR LEFT GREATER SAPH AK COMPRESS: NORMAL
BH CV LOWER VASCULAR LEFT GREATER SAPH BK COMPRESS: NORMAL
BH CV LOWER VASCULAR LEFT LESSER SAPH COMPRESS: NORMAL
BH CV LOWER VASCULAR LEFT MID FEMORAL AUGMENT: NORMAL
BH CV LOWER VASCULAR LEFT MID FEMORAL COMPETENT: NORMAL
BH CV LOWER VASCULAR LEFT MID FEMORAL COMPRESS: NORMAL
BH CV LOWER VASCULAR LEFT MID FEMORAL PHASIC: NORMAL
BH CV LOWER VASCULAR LEFT MID FEMORAL SPONT: NORMAL
BH CV LOWER VASCULAR LEFT PERONEAL COMPRESS: NORMAL
BH CV LOWER VASCULAR LEFT POPLITEAL AUGMENT: NORMAL
BH CV LOWER VASCULAR LEFT POPLITEAL COMPETENT: NORMAL
BH CV LOWER VASCULAR LEFT POPLITEAL COMPRESS: NORMAL
BH CV LOWER VASCULAR LEFT POPLITEAL PHASIC: NORMAL
BH CV LOWER VASCULAR LEFT POPLITEAL SPONT: NORMAL
BH CV LOWER VASCULAR LEFT POSTERIOR TIBIAL COMPRESS: NORMAL
BH CV LOWER VASCULAR LEFT PROFUNDA FEMORAL COMPRESS: NORMAL
BH CV LOWER VASCULAR LEFT PROXIMAL FEMORAL COMPRESS: NORMAL
BH CV LOWER VASCULAR LEFT SAPHENOFEMORAL JUNCTION COMPRESS: NORMAL
BH CV LOWER VASCULAR LEFT SOLEAL COMPRESS: NORMAL
BH CV LOWER VASCULAR RIGHT COMMON FEMORAL AUGMENT: NORMAL
BH CV LOWER VASCULAR RIGHT COMMON FEMORAL COMPETENT: NORMAL
BH CV LOWER VASCULAR RIGHT COMMON FEMORAL COMPRESS: NORMAL
BH CV LOWER VASCULAR RIGHT COMMON FEMORAL PHASIC: NORMAL
BH CV LOWER VASCULAR RIGHT COMMON FEMORAL SPONT: NORMAL
BH CV LOWER VASCULAR RIGHT DISTAL FEMORAL COMPRESS: NORMAL
BH CV LOWER VASCULAR RIGHT EXTERNAL ILIAC AUGMENT: NORMAL
BH CV LOWER VASCULAR RIGHT EXTERNAL ILIAC COMPETENT: NORMAL
BH CV LOWER VASCULAR RIGHT EXTERNAL ILIAC COMPRESS: NORMAL
BH CV LOWER VASCULAR RIGHT EXTERNAL ILIAC PHASIC: NORMAL
BH CV LOWER VASCULAR RIGHT EXTERNAL ILIAC SPONT: NORMAL
BH CV LOWER VASCULAR RIGHT GASTRONEMIUS COMPRESS: NORMAL
BH CV LOWER VASCULAR RIGHT GREATER SAPH AK COMPRESS: NORMAL
BH CV LOWER VASCULAR RIGHT GREATER SAPH BK COMPRESS: NORMAL
BH CV LOWER VASCULAR RIGHT LESSER SAPH COMPRESS: NORMAL
BH CV LOWER VASCULAR RIGHT MID FEMORAL AUGMENT: NORMAL
BH CV LOWER VASCULAR RIGHT MID FEMORAL COMPETENT: NORMAL
BH CV LOWER VASCULAR RIGHT MID FEMORAL COMPRESS: NORMAL
BH CV LOWER VASCULAR RIGHT MID FEMORAL PHASIC: NORMAL
BH CV LOWER VASCULAR RIGHT MID FEMORAL SPONT: NORMAL
BH CV LOWER VASCULAR RIGHT PERONEAL COMPRESS: NORMAL
BH CV LOWER VASCULAR RIGHT POPLITEAL AUGMENT: NORMAL
BH CV LOWER VASCULAR RIGHT POPLITEAL COMPETENT: NORMAL
BH CV LOWER VASCULAR RIGHT POPLITEAL COMPRESS: NORMAL
BH CV LOWER VASCULAR RIGHT POPLITEAL PHASIC: NORMAL
BH CV LOWER VASCULAR RIGHT POPLITEAL SPONT: NORMAL
BH CV LOWER VASCULAR RIGHT POSTERIOR TIBIAL COMPRESS: NORMAL
BH CV LOWER VASCULAR RIGHT PROFUNDA FEMORAL COMPRESS: NORMAL
BH CV LOWER VASCULAR RIGHT PROXIMAL FEMORAL COMPRESS: NORMAL
BH CV LOWER VASCULAR RIGHT SAPHENOFEMORAL JUNCTION COMPRESS: NORMAL
BH CV LOWER VASCULAR RIGHT SOLEAL COMPRESS: NORMAL

## 2025-05-06 PROCEDURE — 93970 EXTREMITY STUDY: CPT

## 2025-05-09 ENCOUNTER — OFFICE VISIT (OUTPATIENT)
Dept: CARDIOLOGY | Age: 65
End: 2025-05-09
Payer: OTHER GOVERNMENT

## 2025-05-09 VITALS
BODY MASS INDEX: 30.73 KG/M2 | OXYGEN SATURATION: 95 % | SYSTOLIC BLOOD PRESSURE: 132 MMHG | HEART RATE: 83 BPM | DIASTOLIC BLOOD PRESSURE: 84 MMHG | WEIGHT: 191.2 LBS | HEIGHT: 66 IN

## 2025-05-09 DIAGNOSIS — I70.90 ARTERIAL OCCLUSION: Primary | ICD-10-CM

## 2025-05-09 DIAGNOSIS — Z88.9 MULTIPLE ALLERGIES: ICD-10-CM

## 2025-05-09 PROCEDURE — 99203 OFFICE O/P NEW LOW 30 MIN: CPT | Performed by: INTERNAL MEDICINE

## 2025-05-09 PROCEDURE — 93000 ELECTROCARDIOGRAM COMPLETE: CPT | Performed by: INTERNAL MEDICINE

## 2025-05-09 NOTE — PROGRESS NOTES
Subjective:     Encounter Date:05/09/25      Patient ID: Paige Garnica is a 64 y.o. female.    Chief Complaint:  History of Present Illness    Dear Dr. Garay,    I had the pleasure of seeing this patient in the office today for initial evaluation and consultation.  I appreciate that you sent her in to see us.  They come in today to be seen for assessment of cardiac source of emboli.    Patient recently had acute abdominal discomfort.  She was made Psychiatric and there was concern about embolic thrombosis in the mesentery.  Currently is being seen by yourself as well as by hematology, evaluation is currently underway as to the exact mechanism.    She is here to assess for possible cardiac causes.    No known cardiac history.  Specifically,  no history of coronary artery disease, congestive heart failure, rheumatic fever, rheumatic heart disease, or congenital heart disease.  History of atrial fibrillation or any arrhythmia.    Couple of days before her presentation they just got hotel when she got diaphoretic and just felt bad.  Did not feel any sensation of palpitations or tachycardia.  No shortness of breath.  No chest pain.  Just got sweaty and weak and gradually started feeling better.  Next day was when she started having abdominal discomfort.    No complaints of chest pain or chest discomfort at other times.  No shortness of breath.  No other palpitations.    Multiple allergies including anything alcohol-based, also has had allergic reactions to adhesive.    The following portions of the patient's history were reviewed and updated as appropriate: allergies, current medications, past family history, past medical history, past social history, past surgical history and problem list.      ECG 12 Lead    Date/Time: 5/9/2025 1:13 PM  Performed by: Matt Fraser III, MD    Authorized by: Matt Fraser III, MD  Comparison: compared with previous ECG   Similar to previous ECG  Rhythm: sinus  "rhythm  Rate: normal  Conduction: conduction normal  ST Segments: ST segments normal  T Waves: T waves normal  QRS axis: normal  Other: no other findings    Clinical impression: normal ECG             Objective:     Vitals:    05/09/25 0920   BP: 132/84   BP Location: Right arm   Patient Position: Sitting   Cuff Size: Large Adult   Pulse: 83   SpO2: 95%   Weight: 86.7 kg (191 lb 3.2 oz)   Height: 167 cm (65.75\")     Body mass index is 31.1 kg/m².      Vitals reviewed.   Constitutional:       General: Not in acute distress.     Appearance: Well-developed. Not diaphoretic.   Eyes:      General:         Right eye: No discharge.         Left eye: No discharge.      Conjunctiva/sclera: Conjunctivae normal.   HENT:      Head: Normocephalic and atraumatic.      Nose: Nose normal.   Neck:      Thyroid: No thyromegaly.      Trachea: No tracheal deviation.   Pulmonary:      Effort: Pulmonary effort is normal. No respiratory distress.      Breath sounds: Normal breath sounds. No stridor.   Chest:      Chest wall: Not tender to palpatation.   Cardiovascular:      Normal rate. Regular rhythm.      Murmurs: There is no murmur.      . No S3 gallop. No click. No rub.   Pulses:     Intact distal pulses.   Edema:     Peripheral edema absent.   Abdominal:      General: Bowel sounds are normal. There is no distension.      Palpations: Abdomen is soft. There is no abdominal mass.   Musculoskeletal: Normal range of motion.         General: No tenderness or deformity.      Cervical back: Normal range of motion and neck supple. Skin:     General: Skin is warm and dry.      Findings: No erythema or rash.   Neurological:      Mental Status: Alert.   Psychiatric:         Attention and Perception: Attention normal.         Data and records reviewed:     Lab Results   Component Value Date    GLUCOSE 81 08/21/2024    BUN 11 08/21/2024    CREATININE 0.81 08/21/2024     08/21/2024    K 4.1 08/21/2024     08/21/2024    CALCIUM 9.9 " 08/21/2024    PROTEINTOT 7.4 08/21/2024    ALBUMIN 4.5 08/21/2024    ALT 34 (H) 08/21/2024    AST 26 08/21/2024    ALKPHOS 156 (H) 08/21/2024    BILITOT 0.4 08/21/2024    GLOB 2.9 08/21/2024    AGRATIO 1.6 08/21/2024    BCR 13.6 08/21/2024    ANIONGAP 13.2 02/22/2021    EGFR 81.7 08/21/2024     Lab Results   Component Value Date    CHOL 215 (H) 02/22/2021    CHOL 220 (H) 08/07/2019    CHOL 235 (H) 09/14/2017     Lab Results   Component Value Date    TRIG 128 08/21/2024    TRIG 122 09/19/2023    TRIG 179 (H) 02/22/2021     Lab Results   Component Value Date    HDL 71 (H) 08/21/2024    HDL 58 09/19/2023    HDL 57 02/22/2021     Lab Results   Component Value Date     (H) 08/21/2024     (H) 09/19/2023     (H) 02/22/2021     Lab Results   Component Value Date    VLDL 22 08/21/2024    VLDL 22 09/19/2023    VLDL 31 02/22/2021     Lab Results   Component Value Date    LDLHDL 2.14 02/22/2021    LDLHDL 2.41 08/07/2019    LDLHDL 2.11 09/14/2017       No radiology results for the last 90 days.          Assessment:          Diagnosis Plan   1. Arterial occlusion  Adult Transthoracic Echo Complete W/ Cont if Necessary Per Protocol    ECG 12 Lead      2. Multiple allergies  ECG 12 Lead             Plan:       1.  Arterial occlusion-evaluation for mechanism underway, consideration of a cardioembolism, no prior cardiac history, no cardiac symptoms.  Had a single episode of diaphoresis the day before she started having symptoms like cannot completely exclude some arrhythmia.  Will obtain an echocardiogram.  I would like to put a monitor on her but she has severe allergies to multiple agents including adhesive's.  She has had reactions before to adhesive stickers so I do not think that we could safely do a monitor.  We discussed the options, at this point I would have her  a Wi[employer]xdia monitor and obtain frequent tracings for us and then we will go from there.  Will await the results on the echocardiogram as  well    Thank you very much for allowing us to participate in the care of this pleasant patient.  Please don't hesitate to call if I can be of assistance in any way.      Current Outpatient Medications:     Eliquis 5 MG tablet tablet, Take 1 tablet by mouth Every 12 (Twelve) Hours for 150 days., Disp: 60 tablet, Rfl: 4    LORATADINE PO, Take 10 mg by mouth., Disp: , Rfl:     NON FORMULARY, As Needed (pain). Cream for uses maybe every 2 months, Disp: , Rfl:     Omeprazole Magnesium (PRILOSEC OTC PO), Take 20 mg by mouth As Needed., Disp: , Rfl:          No follow-ups on file.

## 2025-05-20 ENCOUNTER — HOSPITAL ENCOUNTER (OUTPATIENT)
Dept: CT IMAGING | Facility: HOSPITAL | Age: 65
Discharge: HOME | End: 2025-05-20
Admitting: STUDENT IN AN ORGANIZED HEALTH CARE EDUCATION/TRAINING PROGRAM
Payer: OTHER GOVERNMENT

## 2025-05-20 DIAGNOSIS — I70.90 ARTERIAL OCCLUSION: ICD-10-CM

## 2025-05-20 DIAGNOSIS — K55.069 MESENTERIC THROMBOSIS: ICD-10-CM

## 2025-05-20 PROCEDURE — 74174 CTA ABD&PLVS W/CONTRAST: CPT

## 2025-05-20 PROCEDURE — 25510000001 IOPAMIDOL PER 1 ML: Performed by: STUDENT IN AN ORGANIZED HEALTH CARE EDUCATION/TRAINING PROGRAM

## 2025-05-20 RX ORDER — IOPAMIDOL 755 MG/ML
100 INJECTION, SOLUTION INTRAVASCULAR
Status: COMPLETED | OUTPATIENT
Start: 2025-05-20 | End: 2025-05-20

## 2025-05-20 RX ADMIN — IOPAMIDOL 85 ML: 755 INJECTION, SOLUTION INTRAVENOUS at 15:05

## 2025-05-22 ENCOUNTER — HOSPITAL ENCOUNTER (OUTPATIENT)
Dept: CARDIOLOGY | Facility: HOSPITAL | Age: 65
Discharge: HOME | End: 2025-05-22
Admitting: INTERNAL MEDICINE
Payer: OTHER GOVERNMENT

## 2025-05-22 VITALS
WEIGHT: 191 LBS | SYSTOLIC BLOOD PRESSURE: 150 MMHG | HEIGHT: 65 IN | BODY MASS INDEX: 31.82 KG/M2 | DIASTOLIC BLOOD PRESSURE: 82 MMHG | HEART RATE: 83 BPM | OXYGEN SATURATION: 98 %

## 2025-05-22 DIAGNOSIS — I70.90 ARTERIAL OCCLUSION: ICD-10-CM

## 2025-05-22 LAB
AORTIC ARCH: 2.5 CM
AORTIC DIMENSIONLESS INDEX: 0.71 (DI)
ASCENDING AORTA: 2.9 CM
AV MEAN PRESS GRAD SYS DOP V1V2: 3 MMHG
AV VMAX SYS DOP: 114 CM/SEC
BH CV ECHO MEAS - ACS: 1.91 CM
BH CV ECHO MEAS - AO MAX PG: 5.2 MMHG
BH CV ECHO MEAS - AO ROOT AREA (BSA CORRECTED): 1.6 CM2
BH CV ECHO MEAS - AO ROOT DIAM: 3.1 CM
BH CV ECHO MEAS - AO V2 VTI: 22.4 CM
BH CV ECHO MEAS - AVA(I,D): 2.5 CM2
BH CV ECHO MEAS - EDV(CUBED): 50.7 ML
BH CV ECHO MEAS - EDV(MOD-SP2): 81 ML
BH CV ECHO MEAS - EDV(MOD-SP4): 89 ML
BH CV ECHO MEAS - EF(MOD-SP2): 63 %
BH CV ECHO MEAS - EF(MOD-SP4): 60.7 %
BH CV ECHO MEAS - ESV(CUBED): 13.9 ML
BH CV ECHO MEAS - ESV(MOD-SP2): 30 ML
BH CV ECHO MEAS - ESV(MOD-SP4): 35 ML
BH CV ECHO MEAS - FS: 35 %
BH CV ECHO MEAS - IVS/LVPW: 0.91 CM
BH CV ECHO MEAS - IVSD: 1 CM
BH CV ECHO MEAS - LAT PEAK E' VEL: 5.4 CM/SEC
BH CV ECHO MEAS - LV DIASTOLIC VOL/BSA (35-75): 45.9 CM2
BH CV ECHO MEAS - LV MASS(C)D: 120.8 GRAMS
BH CV ECHO MEAS - LV MAX PG: 2.45 MMHG
BH CV ECHO MEAS - LV MEAN PG: 1 MMHG
BH CV ECHO MEAS - LV SYSTOLIC VOL/BSA (12-30): 18 CM2
BH CV ECHO MEAS - LV V1 MAX: 78.2 CM/SEC
BH CV ECHO MEAS - LV V1 VTI: 15.9 CM
BH CV ECHO MEAS - LVIDD: 3.7 CM
BH CV ECHO MEAS - LVIDS: 2.4 CM
BH CV ECHO MEAS - LVOT AREA: 3.5 CM2
BH CV ECHO MEAS - LVOT DIAM: 2.12 CM
BH CV ECHO MEAS - LVPWD: 1.1 CM
BH CV ECHO MEAS - MED PEAK E' VEL: 4.8 CM/SEC
BH CV ECHO MEAS - MV A DUR: 0.11 SEC
BH CV ECHO MEAS - MV A MAX VEL: 106 CM/SEC
BH CV ECHO MEAS - MV DEC SLOPE: 722.6 CM/SEC2
BH CV ECHO MEAS - MV DEC TIME: 0.17 SEC
BH CV ECHO MEAS - MV E MAX VEL: 90.7 CM/SEC
BH CV ECHO MEAS - MV E/A: 0.86
BH CV ECHO MEAS - MV MAX PG: 5.1 MMHG
BH CV ECHO MEAS - MV MEAN PG: 2.1 MMHG
BH CV ECHO MEAS - MV P1/2T: 38 MSEC
BH CV ECHO MEAS - MV V2 VTI: 26.5 CM
BH CV ECHO MEAS - MVA(P1/2T): 5.8 CM2
BH CV ECHO MEAS - MVA(VTI): 2.11 CM2
BH CV ECHO MEAS - PA ACC TIME: 0.08 SEC
BH CV ECHO MEAS - PA V2 MAX: 111.3 CM/SEC
BH CV ECHO MEAS - PULM A REVS DUR: 0.09 SEC
BH CV ECHO MEAS - PULM A REVS VEL: 55.7 CM/SEC
BH CV ECHO MEAS - PULM DIAS VEL: 25.7 CM/SEC
BH CV ECHO MEAS - PULM S/D: 1.06
BH CV ECHO MEAS - PULM SYS VEL: 27.1 CM/SEC
BH CV ECHO MEAS - QP/QS: 0.89
BH CV ECHO MEAS - RV MAX PG: 2.21 MMHG
BH CV ECHO MEAS - RV V1 MAX: 74.4 CM/SEC
BH CV ECHO MEAS - RV V1 VTI: 15.3 CM
BH CV ECHO MEAS - RVOT DIAM: 2.04 CM
BH CV ECHO MEAS - SUP REN AO DIAM: 2 CM
BH CV ECHO MEAS - SV(LVOT): 56 ML
BH CV ECHO MEAS - SV(MOD-SP2): 51 ML
BH CV ECHO MEAS - SV(MOD-SP4): 54 ML
BH CV ECHO MEAS - SV(RVOT): 49.9 ML
BH CV ECHO MEAS - SVI(LVOT): 28.8 ML/M2
BH CV ECHO MEAS - SVI(MOD-SP2): 26.3 ML/M2
BH CV ECHO MEAS - SVI(MOD-SP4): 27.8 ML/M2
BH CV ECHO MEAS - TAPSE (>1.6): 1.71 CM
BH CV ECHO MEASUREMENTS AVERAGE E/E' RATIO: 17.78
BH CV ECHO SHUNT ASSESSMENT PERFORMED (HIDDEN SCRIPTING): 1
BH CV XLRA - RV BASE: 3 CM
BH CV XLRA - RV LENGTH: 7.3 CM
BH CV XLRA - RV MID: 2.5 CM
BH CV XLRA - TDI S': 10.8 CM/SEC
LEFT ATRIUM VOLUME INDEX: 13.5 ML/M2
LV EF BIPLANE MOD: 61.5 %
SINUS: 2.8 CM
STJ: 2.46 CM

## 2025-05-22 PROCEDURE — 93306 TTE W/DOPPLER COMPLETE: CPT

## 2025-05-22 PROCEDURE — 25510000001 PERFLUTREN 6.52 MG/ML SUSPENSION 2 ML VIAL: Performed by: INTERNAL MEDICINE

## 2025-05-22 RX ADMIN — PERFLUTREN 2 ML: 6.52 INJECTION, SUSPENSION INTRAVENOUS at 11:17

## 2025-05-27 ENCOUNTER — TELEPHONE (OUTPATIENT)
Dept: FAMILY MEDICINE CLINIC | Facility: CLINIC | Age: 65
End: 2025-05-27
Payer: OTHER GOVERNMENT

## 2025-05-27 NOTE — TELEPHONE ENCOUNTER
Patient states she had a echocardiogram done on 05/22 (it's in her chart).  She says it states she has a kidney stone so she is requesting a referral to Urology.  She says it also states she has a blood clot and she is wanting to know if she will be able to do the lithotripsy?  Her phone number is [phone].

## 2025-05-28 ENCOUNTER — OFFICE VISIT (OUTPATIENT)
Age: 65
End: 2025-05-28
Payer: OTHER GOVERNMENT

## 2025-05-28 VITALS
HEIGHT: 65 IN | HEART RATE: 89 BPM | WEIGHT: 191 LBS | SYSTOLIC BLOOD PRESSURE: 160 MMHG | BODY MASS INDEX: 31.82 KG/M2 | DIASTOLIC BLOOD PRESSURE: 129 MMHG

## 2025-05-28 DIAGNOSIS — I70.90 ARTERIAL OCCLUSION: Primary | ICD-10-CM

## 2025-05-28 DIAGNOSIS — K55.069 MESENTERIC THROMBOSIS: ICD-10-CM

## 2025-05-28 NOTE — TELEPHONE ENCOUNTER
Patient called and stated that the Kidney stone was seen on her CT Angiogram Abdomen and Pelvis taken on 5/20/25.    Patient stated that she would like to go somewhere other than [facility] due to an incident at her last appointment.

## 2025-05-28 NOTE — TELEPHONE ENCOUNTER
LVM.  Tried calling pt.    Ok for hub to relay:  I am sorry but I have reviewed her test and it does not say anything about a kidney stone. Also they would not be able to see her kidneys on a test done about her heart. I would ask her to send us a screenshot of where she sees that she has a kidney stone.

## 2025-05-28 NOTE — PROGRESS NOTES
Chief Complaint  Mesenteric thrombosis    Subjective        Paige Garnica presents to [facility] VASCULAR SURGERY  History of Present Illness  64 y.o. 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  She has been on a DOAC since that time with no recurrent symptoms.  Her cardiology workup is still pending.  She is not able to wear Holter monitor due to her allergy to adhesives so she is using an alternative rhythm monitor.      Past Medical History:   Diagnosis Date    Allergic     Arthritis     KNEES    Back pain     DDD    Colon polyp last year    2 small and 1 - 10 mm    Deep vein thrombosis 2025    in intestines at Cecum    Diverticulitis     Diverticulitis of colon     Diverticulosis     Endometriosis     Family history of colon cancer     dad    GERD (gastroesophageal reflux disease)     Kidney stone     Low back pain     PRP (pityriasis rubra pilaris)     PTSD (post-traumatic stress disorder)     Thyroid nodule     TMJ (dislocation of temporomandibular joint)        Past Surgical History:   Procedure Laterality Date    ABLATION OF DYSRHYTHMIC FOCUS      nerves in back    APPENDECTOMY       SECTION      x1    COLON SURGERY      twice, one was RESECTION, other malroation when was a baby    COLONOSCOPY N/A 10/19/2023    Procedure: COLONOSCOPY to cecum and TI with cold polypectomies and clip placement x5;  Surgeon: Matty Song MD;  Location: [facility] ENDOSCOPY;  Service: Gastroenterology;  Laterality: N/A;  PRE - screening  POST - diverticulosis, polyps, hemorrhoids    COLONOSCOPY  10/19/2023    CYSTOSCOPY W/ LASER LITHOTRIPSY      ENDOSCOPY N/A 10/19/2023    Procedure: ESOPHAGOGASTRODUODENOSCOPY;  Surgeon: Matty Song MD;  Location: [facility] ENDOSCOPY;  Service: Gastroenterology;  Laterality: N/A;  PRE - gerd  POST - hiatal hernia    LAPAROSCOPIC LYSIS OF ADHESIONS      ABLATIONS MULTIPLE TIMES    LAPAROTOMY  "OOPHERECTOMY Right     part of left now       Family History   Problem Relation Age of Onset    Heart failure Mother     Diabetes Mother     Dementia Mother     COPD Mother     Asthma Mother     Heart disease Mother     Arthritis Mother     Heart failure Father     Hypertension Father     Colon cancer Father     Asthma Father     Heart disease Father     Hyperlipidemia Father     Cancer Father     Stroke Paternal Grandfather     Asthma Brother     Hyperlipidemia Brother     Mental illness Brother     Thyroid disease Brother     Malig Hyperthermia Neg Hx          Social History     Tobacco Use    Smoking status: Never     Passive exposure: Never    Smokeless tobacco: Never   Vaping Use    Vaping status: Never Used   Substance Use Topics    Alcohol use: Yes     Alcohol/week: 1.0 standard drink of alcohol     Types: 1 Glasses of wine per week     Comment: I have 1-2 glasses of wine a month.    Drug use: Never       Allergies: Alcohol, Disinfectant products misc, Hand  [flavoring agent], Isopropyl alcohol, Isopropyl alcohol, Meperidine, Phenergan [promethazine hcl], Latex, Morphine and codeine, Penicillins, and Promethazine    Prior to Admission medications    Medication Sig Start Date End Date Taking? Authorizing Provider   Eliquis 5 MG tablet tablet Take 1 tablet by mouth Every 12 (Twelve) Hours for 150 days. 4/29/25 9/26/25 Yes Jacob, Aasems, MD   LORATADINE PO Take 10 mg by mouth.   Yes Adam Lux MD   NON FORMULARY As Needed (pain). Cream for uses maybe every 2 months   Yes Adam Lux MD   Omeprazole Magnesium (PRILOSEC OTC PO) Take 20 mg by mouth As Needed.   Yes Adam Lux MD         Objective   Vital Signs:  BP (!) 160/129   Pulse 89   Ht 165.1 cm (65\")   Wt 86.6 kg (191 lb)   BMI 31.78 kg/m²   Estimated body mass index is 31.78 kg/m² as calculated from the following:    Height as of this encounter: 165.1 cm (65\").    Weight as of this encounter: 86.6 kg (191 lb).   "             Physical Exam  Vitals reviewed.   Constitutional:       General: She is not in acute distress.     Appearance: Normal appearance.   HENT:      Head: Normocephalic and atraumatic.      Right Ear: External ear normal.      Left Ear: External ear normal.      Nose: Nose normal.      Mouth/Throat:      Mouth: Mucous membranes are moist.      Pharynx: Oropharynx is clear.   Eyes:      Extraocular Movements: Extraocular movements intact.      Conjunctiva/sclera: Conjunctivae normal.      Pupils: Pupils are equal, round, and reactive to light.   Cardiovascular:      Rate and Rhythm: Normal rate and regular rhythm.      Pulses:           Carotid pulses are 2+ on the right side and 2+ on the left side.       Radial pulses are 2+ on the right side and 2+ on the left side.   Pulmonary:      Comments: Non labored respirations on room air, equal chest rise  Abdominal:      General: Abdomen is flat. There is no distension.      Palpations: Abdomen is soft.   Musculoskeletal:      Cervical back: Normal range of motion. No rigidity.      Right lower leg: No edema.      Left lower leg: No edema.   Skin:     General: Skin is warm and dry.      Capillary Refill: Capillary refill takes less than 2 seconds.   Neurological:      General: No focal deficit present.      Mental Status: She is alert and oriented to person, place, and time.   Psychiatric:         Mood and Affect: Mood normal.         Behavior: Behavior normal.        Result Review :    The following data was reviewed by: Estefany Garay MD on 05/28/2025:  Common labs          8/21/2024    14:24   Common Labs   Glucose 81    BUN 11    Creatinine 0.81    Sodium 140    Potassium 4.1    Chloride 102    Calcium 9.9    Albumin 4.5    Total Bilirubin 0.4    Alkaline Phosphatase 156    AST (SGOT) 26    ALT (SGPT) 34    Total Cholesterol 230    Triglycerides 128    HDL Cholesterol 71    LDL Cholesterol  137    Hemoglobin A1C 5.60      CMP          8/21/2024    14:24    CMP   Glucose 81    BUN 11    Creatinine 0.81    EGFR 81.7    Sodium 140    Potassium 4.1    Chloride 102    Calcium 9.9    Total Protein 7.4    Albumin 4.5    Globulin 2.9    Total Bilirubin 0.4    Alkaline Phosphatase 156    AST (SGOT) 26    ALT (SGPT) 34    Albumin/Globulin Ratio 1.6    BUN/Creatinine Ratio 13.6          BMP          8/21/2024    14:24   BMP   BUN 11    Creatinine 0.81    Sodium 140    Potassium 4.1    Chloride 102    CO2 27.5    Calcium 9.9          Last Echo  Results for orders placed during the hospital encounter of 05/22/25    Adult Transthoracic Echo Complete W/ Cont if Necessary Per Protocol    Interpretation Summary    Left ventricular systolic function is normal. Left ventricular ejection fraction appears to be 61 - 65%.    Left ventricular wall thickness is consistent with mild basal asymmetric hypertrophy.    Left ventricular diastolic function was indeterminate.    Saline test results are negative.       Last Stress       Last Cath  No results found for this or any previous visit.                Assessment and Plan     Diagnoses and all orders for this visit:    1. Arterial occlusion (Primary)  -     CT Angiogram Abdomen Pelvis; Future    2. Mesenteric thrombosis  -     CT Angiogram Abdomen Pelvis; Future      Paige Garnica returns for follow up of mesenteric artery thrombosis.  She remains asymptomatic on eliquis with no recurrent abdominal pain or unintentional weight loss.  She had a follow up CT angiogram of the abdomen and pelvis on 5/20/25 which I independently reviewed.  This showed intestinal malrotation.  She confirmed she had malrotation surgery as an infant.  This also showed thrombosis of the distal 1st jejunal branch of the SMA.  The mesenteric stranding has improved since the initial scan.  The bowel enhances normally and there is no evidence of bowel ischemia.  I discussed these results with her.  She also had a bilateral lower extremity venous duplex on 5/6/25.  There  were no acute or chronic DVT on this study.  I reviewed the hematology note and they recommended a 6 month course of anticoagulation, unless atrial fibrillation or another indication for long term anticoagulation is found.  I agree with this and the patient was in agreement with this.  I would like to get repeat CTA prior to stopping the anticoagulation, so this has been arranged.  I will follow up with her in 2-3 months to review the CTA and discuss stopping the anticoagulation.        Follow Up     Return in about 15 weeks (around 9/10/2025).  Patient was given instructions and counseling regarding her condition or for health maintenance advice. Please see specific information pulled into the AVS if appropriate.   Any information in this note that has been copied and pasted was reviewed and edited to reflect today's exam.  This note was created using Dragon dictation software.  I have reviewed the note for accuracy and made corrections as needed.  Please note that some errors may still exist, please contact me with any questions or concerns.

## 2025-05-29 DIAGNOSIS — N20.0 KIDNEY STONE: Primary | ICD-10-CM

## 2025-06-03 RX ORDER — APIXABAN 5 MG/1
5 TABLET, FILM COATED ORAL EVERY 12 HOURS SCHEDULED
Qty: 180 TABLET | Refills: 3 | Status: SHIPPED | OUTPATIENT
Start: 2025-06-03 | End: 2025-06-05 | Stop reason: SDUPTHER

## 2025-06-05 RX ORDER — APIXABAN 5 MG/1
5 TABLET, FILM COATED ORAL EVERY 12 HOURS SCHEDULED
Qty: 180 TABLET | Refills: 3 | Status: SHIPPED | OUTPATIENT
Start: 2025-06-05 | End: 2026-05-31

## (undated) DEVICE — SENSR O2 OXIMAX FNGR A/ 18IN NONSTR

## (undated) DEVICE — SNAR POLYP CAPTIVATOR RND STFF 2.4 240CM 10MM 1P/U

## (undated) DEVICE — FRCP BX RADJAW4 NDL 2.8 240CM LG OG BX40

## (undated) DEVICE — LN SMPL CO2 SHTRM SD STREAM W/M LUER

## (undated) DEVICE — BLCK/BITE BLOX W/DENTL/RIM W/STRAP 54F

## (undated) DEVICE — THE SINGLE USE ETRAP – POLYP TRAP IS USED FOR SUCTION RETRIEVAL OF ENDOSCOPICALLY REMOVED POLYPS.: Brand: ETRAP

## (undated) DEVICE — ADAPT CLN BIOGUARD AIR/H2O DISP

## (undated) DEVICE — TUBING, SUCTION, 1/4" X 10', STRAIGHT: Brand: MEDLINE

## (undated) DEVICE — CANN O2 ETCO2 FITS ALL CONN CO2 SMPL A/ 7IN DISP LF

## (undated) DEVICE — KT ORCA ORCAPOD DISP STRL